# Patient Record
Sex: FEMALE | Race: WHITE | NOT HISPANIC OR LATINO | URBAN - NONMETROPOLITAN AREA
[De-identification: names, ages, dates, MRNs, and addresses within clinical notes are randomized per-mention and may not be internally consistent; named-entity substitution may affect disease eponyms.]

---

## 2022-08-10 ENCOUNTER — APPOINTMENT (OUTPATIENT)
Dept: LAB | Facility: MEDICAL CENTER | Age: 67
End: 2022-08-10
Payer: MEDICARE

## 2022-08-10 DIAGNOSIS — Z01.818 OTHER SPECIFIED PRE-OPERATIVE EXAMINATION: ICD-10-CM

## 2022-08-10 LAB
ALBUMIN SERPL BCP-MCNC: 3.8 G/DL (ref 3.5–5)
ALP SERPL-CCNC: 99 U/L (ref 46–116)
ALT SERPL W P-5'-P-CCNC: 22 U/L (ref 12–78)
ANION GAP SERPL CALCULATED.3IONS-SCNC: 7 MMOL/L (ref 4–13)
APTT PPP: 26 SECONDS (ref 23–37)
AST SERPL W P-5'-P-CCNC: 14 U/L (ref 5–45)
BASOPHILS # BLD AUTO: 0.05 THOUSANDS/ΜL (ref 0–0.1)
BASOPHILS NFR BLD AUTO: 1 % (ref 0–1)
BILIRUB SERPL-MCNC: 0.54 MG/DL (ref 0.2–1)
BUN SERPL-MCNC: 13 MG/DL (ref 5–25)
CALCIUM SERPL-MCNC: 9.6 MG/DL (ref 8.3–10.1)
CHLORIDE SERPL-SCNC: 106 MMOL/L (ref 96–108)
CO2 SERPL-SCNC: 26 MMOL/L (ref 21–32)
CREAT SERPL-MCNC: 0.97 MG/DL (ref 0.6–1.3)
EOSINOPHIL # BLD AUTO: 0.16 THOUSAND/ΜL (ref 0–0.61)
EOSINOPHIL NFR BLD AUTO: 3 % (ref 0–6)
ERYTHROCYTE [DISTWIDTH] IN BLOOD BY AUTOMATED COUNT: 13.3 % (ref 11.6–15.1)
GFR SERPL CREATININE-BSD FRML MDRD: 60 ML/MIN/1.73SQ M
GLUCOSE P FAST SERPL-MCNC: 114 MG/DL (ref 65–99)
HCT VFR BLD AUTO: 44.1 % (ref 34.8–46.1)
HGB BLD-MCNC: 14.5 G/DL (ref 11.5–15.4)
IMM GRANULOCYTES # BLD AUTO: 0.02 THOUSAND/UL (ref 0–0.2)
IMM GRANULOCYTES NFR BLD AUTO: 0 % (ref 0–2)
INR PPP: 0.91 (ref 0.84–1.19)
LYMPHOCYTES # BLD AUTO: 1.25 THOUSANDS/ΜL (ref 0.6–4.47)
LYMPHOCYTES NFR BLD AUTO: 23 % (ref 14–44)
MCH RBC QN AUTO: 30 PG (ref 26.8–34.3)
MCHC RBC AUTO-ENTMCNC: 32.9 G/DL (ref 31.4–37.4)
MCV RBC AUTO: 91 FL (ref 82–98)
MONOCYTES # BLD AUTO: 0.58 THOUSAND/ΜL (ref 0.17–1.22)
MONOCYTES NFR BLD AUTO: 11 % (ref 4–12)
NEUTROPHILS # BLD AUTO: 3.35 THOUSANDS/ΜL (ref 1.85–7.62)
NEUTS SEG NFR BLD AUTO: 62 % (ref 43–75)
NRBC BLD AUTO-RTO: 0 /100 WBCS
PLATELET # BLD AUTO: 242 THOUSANDS/UL (ref 149–390)
PMV BLD AUTO: 10 FL (ref 8.9–12.7)
POTASSIUM SERPL-SCNC: 3.7 MMOL/L (ref 3.5–5.3)
PROT SERPL-MCNC: 7.7 G/DL (ref 6.4–8.4)
PROTHROMBIN TIME: 12.5 SECONDS (ref 11.6–14.5)
RBC # BLD AUTO: 4.83 MILLION/UL (ref 3.81–5.12)
SODIUM SERPL-SCNC: 139 MMOL/L (ref 135–147)
WBC # BLD AUTO: 5.41 THOUSAND/UL (ref 4.31–10.16)

## 2022-08-10 PROCEDURE — 36415 COLL VENOUS BLD VENIPUNCTURE: CPT

## 2022-08-10 PROCEDURE — 85610 PROTHROMBIN TIME: CPT

## 2022-08-10 PROCEDURE — 80053 COMPREHEN METABOLIC PANEL: CPT

## 2022-08-10 PROCEDURE — 85025 COMPLETE CBC W/AUTO DIFF WBC: CPT

## 2022-08-10 PROCEDURE — 85730 THROMBOPLASTIN TIME PARTIAL: CPT

## 2024-12-12 ENCOUNTER — OFFICE VISIT (OUTPATIENT)
Dept: URGENT CARE | Facility: MEDICAL CENTER | Age: 69
End: 2024-12-12
Payer: MEDICARE

## 2024-12-12 VITALS
WEIGHT: 199 LBS | TEMPERATURE: 97.9 F | RESPIRATION RATE: 16 BRPM | SYSTOLIC BLOOD PRESSURE: 138 MMHG | DIASTOLIC BLOOD PRESSURE: 94 MMHG | HEART RATE: 82 BPM | OXYGEN SATURATION: 98 %

## 2024-12-12 DIAGNOSIS — M54.50 ACUTE RIGHT-SIDED LOW BACK PAIN WITHOUT SCIATICA: Primary | ICD-10-CM

## 2024-12-12 PROCEDURE — 99212 OFFICE O/P EST SF 10 MIN: CPT | Performed by: PHYSICAL MEDICINE & REHABILITATION

## 2024-12-12 PROCEDURE — G0463 HOSPITAL OUTPT CLINIC VISIT: HCPCS | Performed by: PHYSICAL MEDICINE & REHABILITATION

## 2024-12-12 RX ORDER — ACETAMINOPHEN 160 MG
2000 TABLET,DISINTEGRATING ORAL DAILY
COMMUNITY
Start: 2024-07-10

## 2024-12-12 RX ORDER — ATORVASTATIN CALCIUM 20 MG/1
20 TABLET, FILM COATED ORAL EVERY MORNING
COMMUNITY
Start: 2024-12-03

## 2024-12-12 RX ORDER — HYDROCHLOROTHIAZIDE 12.5 MG/1
12.5 CAPSULE ORAL DAILY
COMMUNITY
Start: 2024-10-29

## 2024-12-12 RX ORDER — PREDNISONE 20 MG/1
40 TABLET ORAL DAILY
Qty: 10 TABLET | Refills: 0 | Status: SHIPPED | OUTPATIENT
Start: 2024-12-12 | End: 2024-12-17

## 2024-12-12 RX ORDER — CYCLOBENZAPRINE HCL 10 MG
10 TABLET ORAL 3 TIMES DAILY PRN
Qty: 20 TABLET | Refills: 0 | Status: SHIPPED | OUTPATIENT
Start: 2024-12-12

## 2024-12-12 RX ORDER — LOSARTAN POTASSIUM 50 MG/1
50 TABLET ORAL DAILY
COMMUNITY
Start: 2024-10-29

## 2024-12-12 RX ORDER — PANTOPRAZOLE SODIUM 40 MG/1
40 TABLET, DELAYED RELEASE ORAL DAILY
COMMUNITY
Start: 2024-09-20

## 2024-12-12 NOTE — PROGRESS NOTES
"  [facility]        NAME: Miley Locke is a 69 y.o. female  : 1955    MRN: [id number]  DATE: 2024  TIME: 1:34 PM    Assessment and Plan   Acute right-sided low back pain without sciatica [M54.50]  1. Acute right-sided low back pain without sciatica  predniSONE 20 mg tablet    cyclobenzaprine (FLEXERIL) 10 mg tablet        Likely a muscle strain given exertion 2 days prior to the pain  Will trial Prednisone and Flexeril.   Advised to take Prednisone in the morning and to trial Flexeril at nighttime first. Did note low sedation potential with Flexeril. If no sedation, may take up to TID PRN.    Patient Instructions       Follow up with PCP in 3-5 days.  Proceed to  ER if symptoms worsen.    If tests are performed, our office will contact you with results only if changes need to made to the care plan discussed with you at the visit. [facility]    Chief Complaint     Chief Complaint   Patient presents with    Back Pain     Right proximal back pain which started 1 week ago.         History of Present Illness       Pt is a 69 year old female presenting with right paraspinal mid-low back pain that started approximately 1 week ago. She does not remember a direct injury/trauma to the region. She denies having a fall. She does not possibly 2 days prior to the pain starting she was cleaning her bathroom from top to bottom and was on her hands/knees and twisting. She notes she was using a toothbrush to scrub the grout between her tiles. She denies weakness or shooting pain down the LE. She denies numbness/tingling. She states the pain is intermittent, coming and going. She denies dysuria, urinary frequency or urgency. She notes that she has had kidney stones in the past and that this does not feel similar. She describes the pain as an ache stating \"it feels like the pain of a bruise\".     Back Pain  Pertinent negatives include no numbness or " weakness.       Review of Systems   Review of Systems   Constitutional: Negative.    Respiratory: Negative.     Cardiovascular: Negative.    Gastrointestinal: Negative.    Genitourinary: Negative.    Musculoskeletal:  Positive for back pain.   Skin: Negative.    Neurological: Negative.  Negative for weakness and numbness.         Current Medications       Current Outpatient Medications:     atorvastatin (LIPITOR) 20 mg tablet, Take 20 mg by mouth every morning, Disp: , Rfl:     Cholecalciferol (Vitamin D3) 50 MCG (2000 UT) capsule, Take 2,000 Units by mouth daily, Disp: , Rfl:     cyclobenzaprine (FLEXERIL) 10 mg tablet, Take 1 tablet (10 mg total) by mouth 3 (three) times a day as needed for muscle spasms, Disp: 20 tablet, Rfl: 0    hydroCHLOROthiazide 12.5 mg capsule, Take 12.5 mg by mouth daily, Disp: , Rfl:     losartan (COZAAR) 50 mg tablet, Take 50 mg by mouth daily, Disp: , Rfl:     pantoprazole (PROTONIX) 40 mg tablet, Take 40 mg by mouth daily, Disp: , Rfl:     predniSONE 20 mg tablet, Take 2 tablets (40 mg total) by mouth daily for 5 days, Disp: 10 tablet, Rfl: 0    Current Allergies     Allergies as of 12/12/2024 - never reviewed   Allergen Reaction Noted    Aspirin Edema 12/12/2024            The following portions of the patient's history were reviewed and updated as appropriate: allergies, current medications, past family history, past medical history, past social history, past surgical history and problem list.     Past Medical History:   Diagnosis Date    Acid reflux     Hypertension        Past Surgical History:   Procedure Laterality Date    HYSTERECTOMY      JOINT REPLACEMENT      KNEE ARTHROCENTESIS         History reviewed. No pertinent family history.      Medications have been verified.        Objective   /94   Pulse 82   Temp 97.9 °F (36.6 °C)   Resp 16   Wt 90.3 kg (199 lb)   SpO2 98%        Physical Exam     Physical Exam  Vitals reviewed.   Constitutional:       General: She is  not in acute distress.     Appearance: She is not ill-appearing.   Cardiovascular:      Rate and Rhythm: Normal rate and regular rhythm.      Pulses: Normal pulses.      Heart sounds: Normal heart sounds.   Pulmonary:      Effort: Pulmonary effort is normal. No respiratory distress.      Breath sounds: Normal breath sounds.   Abdominal:      General: Bowel sounds are normal. There is no distension.      Palpations: Abdomen is soft.      Tenderness: There is no abdominal tenderness. There is no right CVA tenderness or left CVA tenderness.   Musculoskeletal:         General: Swelling and tenderness present. No deformity or signs of injury. Normal range of motion.   Skin:     General: Skin is warm.   Neurological:      General: No focal deficit present.      Mental Status: She is alert and oriented to person, place, and time.      Sensory: No sensory deficit.      Motor: No weakness.      Gait: Gait normal.

## 2025-02-07 ENCOUNTER — OFFICE VISIT (OUTPATIENT)
Dept: URGENT CARE | Facility: MEDICAL CENTER | Age: 70
End: 2025-02-07
Payer: MEDICARE

## 2025-02-07 VITALS
RESPIRATION RATE: 18 BRPM | TEMPERATURE: 98.7 F | HEIGHT: 66 IN | SYSTOLIC BLOOD PRESSURE: 126 MMHG | OXYGEN SATURATION: 97 % | DIASTOLIC BLOOD PRESSURE: 63 MMHG | BODY MASS INDEX: 31.02 KG/M2 | WEIGHT: 193 LBS | HEART RATE: 88 BPM

## 2025-02-07 DIAGNOSIS — R14.3: Primary | ICD-10-CM

## 2025-02-07 PROBLEM — R01.1 HEART MURMUR: Status: ACTIVE | Noted: 2022-12-27

## 2025-02-07 PROBLEM — E78.5 DYSLIPIDEMIA: Status: ACTIVE | Noted: 2019-06-11

## 2025-02-07 PROBLEM — Z96.659 HISTORY OF PARTIAL KNEE REPLACEMENT: Status: ACTIVE | Noted: 2022-12-27

## 2025-02-07 PROBLEM — R73.03 PREDIABETES: Status: ACTIVE | Noted: 2018-11-07

## 2025-02-07 PROCEDURE — G0463 HOSPITAL OUTPT CLINIC VISIT: HCPCS

## 2025-02-07 PROCEDURE — 99213 OFFICE O/P EST LOW 20 MIN: CPT

## 2025-02-07 NOTE — PROGRESS NOTES
[facility]        NAME: Miley Locke is a 69 y.o. female  : 1955    MRN: [id number]  DATE: 2025  TIME: 7:08 PM    Assessment and Plan   Pain relieved by passing flatus [R14.3]  1. Pain relieved by passing flatus              Patient Instructions       Follow up with PCP in 3-5 days.  Proceed to  ER if symptoms worsen.    If tests are performed, our office will contact you with results only if changes need to made to the care plan discussed with you at the visit. [facility].    Chief Complaint     Chief Complaint   Patient presents with   • Abdominal Pain     Stomach cramping that started on Wednesday night          History of Present Illness       Patient here with stomach cramping. She has been drinking fluids but has had a decreased appetite, no nausea or vomiting.  No headache, dizziness. She reports the pain is in the middle and low, it resolves with passing gas. She has had normal BM. Last BM was 11am. She was having some difficulty with moving bowels initially but has since changed to normal color and consistency. Denies any urinary symptoms. No fever or chills.         Review of Systems   Review of Systems   Constitutional:  Positive for appetite change. Negative for chills, fatigue and fever.   Gastrointestinal:  Positive for abdominal pain. Negative for constipation, diarrhea, nausea and vomiting.   Skin:  Negative for color change and rash.   Neurological:  Negative for dizziness, light-headedness and headaches.         Current Medications       Current Outpatient Medications:   •  atorvastatin (LIPITOR) 20 mg tablet, Take 20 mg by mouth every morning, Disp: , Rfl:   •  cyclobenzaprine (FLEXERIL) 10 mg tablet, Take 1 tablet (10 mg total) by mouth 3 (three) times a day as needed for muscle spasms, Disp: 20 tablet, Rfl: 0  •  hydroCHLOROthiazide 12.5 mg capsule, Take 12.5 mg by mouth daily, Disp: , Rfl:   •  losartan (COZAAR) 50  "mg tablet, Take 50 mg by mouth daily, Disp: , Rfl:   •  pantoprazole (PROTONIX) 40 mg tablet, Take 40 mg by mouth daily, Disp: , Rfl:   •  Cholecalciferol (Vitamin D3) 50 MCG (2000 UT) capsule, Take 2,000 Units by mouth daily, Disp: , Rfl:     Current Allergies     Allergies as of 02/07/2025 - Reviewed 02/07/2025   Allergen Reaction Noted   • Aspirin Edema and Swelling 04/14/2003            The following portions of the patient's history were reviewed and updated as appropriate: allergies, current medications, past family history, past medical history, past social history, past surgical history and problem list.     Past Medical History:   Diagnosis Date   • Acid reflux    • Hypertension        Past Surgical History:   Procedure Laterality Date   • HYSTERECTOMY     • JOINT REPLACEMENT     • KNEE ARTHROCENTESIS         History reviewed. No pertinent family history.      Medications have been verified.        Objective   /63   Pulse 88   Temp 98.7 °F (37.1 °C)   Resp 18   Ht 5' 6\" (1.676 m)   Wt 87.5 kg (193 lb)   SpO2 97%   BMI 31.15 kg/m²        Physical Exam     Physical Exam  Vitals and nursing note reviewed.   Constitutional:       General: She is awake. She is not in acute distress.     Appearance: Normal appearance. She is well-developed and normal weight. She is not ill-appearing.   HENT:      Head: Normocephalic and atraumatic.      Nose: Nose normal.      Mouth/Throat:      Lips: Pink.      Mouth: Mucous membranes are moist.      Pharynx: Oropharynx is clear.   Eyes:      Extraocular Movements: Extraocular movements intact.      Conjunctiva/sclera: Conjunctivae normal.      Pupils: Pupils are equal, round, and reactive to light.   Cardiovascular:      Rate and Rhythm: Normal rate and regular rhythm.      Pulses: Normal pulses.      Heart sounds: Normal heart sounds.   Pulmonary:      Effort: Pulmonary effort is normal.      Breath sounds: Normal breath sounds.   Abdominal:      General: Abdomen " is flat. Bowel sounds are normal.      Palpations: Abdomen is soft.      Tenderness: There is no abdominal tenderness. There is no right CVA tenderness or left CVA tenderness.   Musculoskeletal:         General: Normal range of motion.      Cervical back: Full passive range of motion without pain, normal range of motion and neck supple.   Skin:     General: Skin is warm and dry.      Capillary Refill: Capillary refill takes less than 2 seconds.      Findings: No rash.   Neurological:      General: No focal deficit present.      Mental Status: She is alert and oriented to person, place, and time. Mental status is at baseline.   Psychiatric:         Mood and Affect: Mood normal.         Behavior: Behavior normal. Behavior is cooperative.

## 2025-02-09 ENCOUNTER — APPOINTMENT (EMERGENCY)
Dept: CT IMAGING | Facility: HOSPITAL | Age: 70
DRG: 391 | End: 2025-02-09
Payer: MEDICARE

## 2025-02-09 ENCOUNTER — HOSPITAL ENCOUNTER (INPATIENT)
Facility: HOSPITAL | Age: 70
LOS: 3 days | Discharge: HOME | DRG: 391 | End: 2025-02-13
Attending: EMERGENCY MEDICINE | Admitting: HOSPITALIST
Payer: MEDICARE

## 2025-02-09 DIAGNOSIS — K57.92 DIVERTICULITIS: ICD-10-CM

## 2025-02-09 DIAGNOSIS — K57.20 COLONIC DIVERTICULAR ABSCESS: Primary | ICD-10-CM

## 2025-02-09 PROBLEM — K59.00 CONSTIPATION: Status: ACTIVE | Noted: 2025-02-09

## 2025-02-09 LAB
ALBUMIN SERPL BCG-MCNC: 4.2 G/DL (ref 3.5–5)
ALP SERPL-CCNC: 68 U/L (ref 34–104)
ALT SERPL W P-5'-P-CCNC: 9 U/L (ref 7–52)
ANION GAP SERPL CALCULATED.3IONS-SCNC: 12 MMOL/L (ref 4–13)
AST SERPL W P-5'-P-CCNC: 12 U/L (ref 13–39)
BACTERIA UR QL AUTO: NORMAL /HPF
BASOPHILS # BLD AUTO: 0.06 THOUSANDS/ÂΜL (ref 0–0.1)
BASOPHILS NFR BLD AUTO: 1 % (ref 0–1)
BILIRUB SERPL-MCNC: 1.23 MG/DL (ref 0.2–1)
BILIRUB UR QL STRIP: NEGATIVE
BUN SERPL-MCNC: 14 MG/DL (ref 5–25)
CALCIUM SERPL-MCNC: 9.6 MG/DL (ref 8.4–10.2)
CHLORIDE SERPL-SCNC: 96 MMOL/L (ref 96–108)
CLARITY UR: CLEAR
CO2 SERPL-SCNC: 28 MMOL/L (ref 21–32)
COLOR UR: COLORLESS
CREAT SERPL-MCNC: 1.13 MG/DL (ref 0.6–1.3)
EOSINOPHIL # BLD AUTO: 0.1 THOUSAND/ÂΜL (ref 0–0.61)
EOSINOPHIL NFR BLD AUTO: 1 % (ref 0–6)
ERYTHROCYTE [DISTWIDTH] IN BLOOD BY AUTOMATED COUNT: 12.5 % (ref 11.6–15.1)
FLUAV AG UPPER RESP QL IA.RAPID: NEGATIVE
FLUBV AG UPPER RESP QL IA.RAPID: NEGATIVE
GFR SERPL CREATININE-BSD FRML MDRD: 49 ML/MIN/1.73SQ M
GLUCOSE SERPL-MCNC: 112 MG/DL (ref 65–140)
GLUCOSE UR STRIP-MCNC: NEGATIVE MG/DL
HCT VFR BLD AUTO: 40.5 % (ref 34.8–46.1)
HGB BLD-MCNC: 13.9 G/DL (ref 11.5–15.4)
HGB UR QL STRIP.AUTO: ABNORMAL
IMM GRANULOCYTES # BLD AUTO: 0.03 THOUSAND/UL (ref 0–0.2)
IMM GRANULOCYTES NFR BLD AUTO: 0 % (ref 0–2)
KETONES UR STRIP-MCNC: NEGATIVE MG/DL
LACTATE SERPL-SCNC: 1.5 MMOL/L (ref 0.5–2)
LEUKOCYTE ESTERASE UR QL STRIP: NEGATIVE
LIPASE SERPL-CCNC: 29 U/L (ref 11–82)
LYMPHOCYTES # BLD AUTO: 1 THOUSANDS/ÂΜL (ref 0.6–4.47)
LYMPHOCYTES NFR BLD AUTO: 10 % (ref 14–44)
MCH RBC QN AUTO: 30.9 PG (ref 26.8–34.3)
MCHC RBC AUTO-ENTMCNC: 34.3 G/DL (ref 31.4–37.4)
MCV RBC AUTO: 90 FL (ref 82–98)
MONOCYTES # BLD AUTO: 0.9 THOUSAND/ÂΜL (ref 0.17–1.22)
MONOCYTES NFR BLD AUTO: 9 % (ref 4–12)
NEUTROPHILS # BLD AUTO: 7.68 THOUSANDS/ÂΜL (ref 1.85–7.62)
NEUTS SEG NFR BLD AUTO: 79 % (ref 43–75)
NITRITE UR QL STRIP: NEGATIVE
NON-SQ EPI CELLS URNS QL MICRO: NORMAL /HPF
NRBC BLD AUTO-RTO: 0 /100 WBCS
PH UR STRIP.AUTO: 6 [PH]
PLATELET # BLD AUTO: 270 THOUSANDS/UL (ref 149–390)
PMV BLD AUTO: 9.3 FL (ref 8.9–12.7)
POTASSIUM SERPL-SCNC: 3 MMOL/L (ref 3.5–5.3)
PROT SERPL-MCNC: 7.6 G/DL (ref 6.4–8.4)
PROT UR STRIP-MCNC: NEGATIVE MG/DL
RBC # BLD AUTO: 4.5 MILLION/UL (ref 3.81–5.12)
RBC #/AREA URNS AUTO: NORMAL /HPF
SARS-COV+SARS-COV-2 AG RESP QL IA.RAPID: NEGATIVE
SODIUM SERPL-SCNC: 136 MMOL/L (ref 135–147)
SP GR UR STRIP.AUTO: <1.005 (ref 1–1.03)
UROBILINOGEN UR STRIP-ACNC: <2 MG/DL
WBC # BLD AUTO: 9.77 THOUSAND/UL (ref 4.31–10.16)
WBC #/AREA URNS AUTO: NORMAL /HPF

## 2025-02-09 PROCEDURE — 36415 COLL VENOUS BLD VENIPUNCTURE: CPT | Performed by: EMERGENCY MEDICINE

## 2025-02-09 PROCEDURE — 81001 URINALYSIS AUTO W/SCOPE: CPT | Performed by: EMERGENCY MEDICINE

## 2025-02-09 PROCEDURE — 87811 SARS-COV-2 COVID19 W/OPTIC: CPT | Performed by: EMERGENCY MEDICINE

## 2025-02-09 PROCEDURE — 74177 CT ABD & PELVIS W/CONTRAST: CPT

## 2025-02-09 PROCEDURE — 83605 ASSAY OF LACTIC ACID: CPT | Performed by: EMERGENCY MEDICINE

## 2025-02-09 PROCEDURE — 99285 EMERGENCY DEPT VISIT HI MDM: CPT | Performed by: EMERGENCY MEDICINE

## 2025-02-09 PROCEDURE — 96361 HYDRATE IV INFUSION ADD-ON: CPT

## 2025-02-09 PROCEDURE — 87804 INFLUENZA ASSAY W/OPTIC: CPT | Performed by: EMERGENCY MEDICINE

## 2025-02-09 PROCEDURE — 80053 COMPREHEN METABOLIC PANEL: CPT | Performed by: EMERGENCY MEDICINE

## 2025-02-09 PROCEDURE — 83690 ASSAY OF LIPASE: CPT | Performed by: EMERGENCY MEDICINE

## 2025-02-09 PROCEDURE — 85025 COMPLETE CBC W/AUTO DIFF WBC: CPT | Performed by: EMERGENCY MEDICINE

## 2025-02-09 PROCEDURE — 99223 1ST HOSP IP/OBS HIGH 75: CPT | Performed by: INTERNAL MEDICINE

## 2025-02-09 PROCEDURE — 96367 TX/PROPH/DG ADDL SEQ IV INF: CPT

## 2025-02-09 PROCEDURE — 96365 THER/PROPH/DIAG IV INF INIT: CPT

## 2025-02-09 PROCEDURE — 99284 EMERGENCY DEPT VISIT MOD MDM: CPT

## 2025-02-09 PROCEDURE — 87040 BLOOD CULTURE FOR BACTERIA: CPT | Performed by: EMERGENCY MEDICINE

## 2025-02-09 RX ORDER — ATORVASTATIN CALCIUM 10 MG/1
20 TABLET, FILM COATED ORAL
Status: DISCONTINUED | OUTPATIENT
Start: 2025-02-10 | End: 2025-02-13 | Stop reason: HOSPADM

## 2025-02-09 RX ORDER — MORPHINE SULFATE 4 MG/ML
4 INJECTION, SOLUTION INTRAMUSCULAR; INTRAVENOUS EVERY 4 HOURS PRN
Status: DISCONTINUED | OUTPATIENT
Start: 2025-02-09 | End: 2025-02-13 | Stop reason: HOSPADM

## 2025-02-09 RX ORDER — METRONIDAZOLE 500 MG/100ML
500 INJECTION, SOLUTION INTRAVENOUS ONCE
Status: COMPLETED | OUTPATIENT
Start: 2025-02-09 | End: 2025-02-09

## 2025-02-09 RX ORDER — CEFTRIAXONE 2 G/50ML
2000 INJECTION, SOLUTION INTRAVENOUS ONCE
Status: COMPLETED | OUTPATIENT
Start: 2025-02-09 | End: 2025-02-09

## 2025-02-09 RX ORDER — POTASSIUM CHLORIDE 1500 MG/1
40 TABLET, EXTENDED RELEASE ORAL ONCE
Status: COMPLETED | OUTPATIENT
Start: 2025-02-09 | End: 2025-02-09

## 2025-02-09 RX ORDER — PANTOPRAZOLE SODIUM 40 MG/1
40 TABLET, DELAYED RELEASE ORAL
Status: DISCONTINUED | OUTPATIENT
Start: 2025-02-10 | End: 2025-02-13 | Stop reason: HOSPADM

## 2025-02-09 RX ORDER — ONDANSETRON 2 MG/ML
4 INJECTION INTRAMUSCULAR; INTRAVENOUS EVERY 6 HOURS PRN
Status: DISCONTINUED | OUTPATIENT
Start: 2025-02-09 | End: 2025-02-13 | Stop reason: HOSPADM

## 2025-02-09 RX ORDER — HYDROCHLOROTHIAZIDE 12.5 MG/1
12.5 TABLET ORAL DAILY
Status: DISCONTINUED | OUTPATIENT
Start: 2025-02-10 | End: 2025-02-13 | Stop reason: HOSPADM

## 2025-02-09 RX ORDER — SODIUM CHLORIDE, SODIUM GLUCONATE, SODIUM ACETATE, POTASSIUM CHLORIDE, MAGNESIUM CHLORIDE, SODIUM PHOSPHATE, DIBASIC, AND POTASSIUM PHOSPHATE .53; .5; .37; .037; .03; .012; .00082 G/100ML; G/100ML; G/100ML; G/100ML; G/100ML; G/100ML; G/100ML
100 INJECTION, SOLUTION INTRAVENOUS CONTINUOUS
Status: DISPENSED | OUTPATIENT
Start: 2025-02-09 | End: 2025-02-11

## 2025-02-09 RX ORDER — ENOXAPARIN SODIUM 100 MG/ML
40 INJECTION SUBCUTANEOUS DAILY
Status: DISCONTINUED | OUTPATIENT
Start: 2025-02-10 | End: 2025-02-13 | Stop reason: HOSPADM

## 2025-02-09 RX ORDER — METRONIDAZOLE 500 MG/100ML
500 INJECTION, SOLUTION INTRAVENOUS EVERY 8 HOURS
Status: DISCONTINUED | OUTPATIENT
Start: 2025-02-10 | End: 2025-02-11

## 2025-02-09 RX ORDER — CEFTRIAXONE 2 G/50ML
2000 INJECTION, SOLUTION INTRAVENOUS EVERY 24 HOURS
Status: DISCONTINUED | OUTPATIENT
Start: 2025-02-10 | End: 2025-02-13 | Stop reason: HOSPADM

## 2025-02-09 RX ORDER — ACETAMINOPHEN 325 MG/1
650 TABLET ORAL EVERY 6 HOURS PRN
Status: DISCONTINUED | OUTPATIENT
Start: 2025-02-09 | End: 2025-02-13 | Stop reason: HOSPADM

## 2025-02-09 RX ORDER — LOSARTAN POTASSIUM 50 MG/1
50 TABLET ORAL DAILY
Status: DISCONTINUED | OUTPATIENT
Start: 2025-02-10 | End: 2025-02-13 | Stop reason: HOSPADM

## 2025-02-09 RX ADMIN — METRONIDAZOLE 500 MG: 500 INJECTION, SOLUTION INTRAVENOUS at 20:11

## 2025-02-09 RX ADMIN — IOHEXOL 100 ML: 350 INJECTION, SOLUTION INTRAVENOUS at 18:23

## 2025-02-09 RX ADMIN — CEFTRIAXONE 2000 MG: 2 INJECTION, SOLUTION INTRAVENOUS at 19:35

## 2025-02-09 RX ADMIN — SODIUM CHLORIDE 1000 ML: 0.9 INJECTION, SOLUTION INTRAVENOUS at 17:30

## 2025-02-09 RX ADMIN — POTASSIUM CHLORIDE 40 MEQ: 1500 TABLET, EXTENDED RELEASE ORAL at 22:23

## 2025-02-09 NOTE — ED PROVIDER NOTES
Final Diagnosis:  1. Diverticulitis        MDM:  -Patient presents for concerns of bowel obstruction.  Will also evaluate for other signs of infection, pancreatitis, anemia, leukocytosis or biliary disease.  Provide CT imaging to evaluate for signs of diverticulitis, obstruction, Juliana cystitis.  Evaluate for Davidson, urinary tract infection.  - CT imaging shows diverticulitis and abscess.  Reviewed with surgery.  They suggest continued antibiotics and admission for evaluation in the morning.  This was reviewed with the medical team.  Admitted to their care.    Chief Complaint   Patient presents with    Constipation     Patient seen on Wednesday at urgent care for gas, states last BM was last night around 1900 and was more like diarrhea. Here due to concerns due to hx of bowel obstruction     HPI  Patient presents for evaluation of intermittent abdominal pain, decreased bowel movements.  Concern for small bowel obstruction.  Patient states that for the last week she has been having some intermittent bowel pain.  Originally this was on the right lower quadrant.  She went to another facility and was evaluated there.  They felt that it was unlikely that the patient had acute pathology and suggested Gas-X as well as Pepto-Bismol.  Patient felt a little bit better on Thursday and Friday then a little bit worse yesterday.  Today she started to have increased pain around 11:00 as well as decreased bowel movements.  She is not sure if this is because of her decreased intake or not.  She states that she did have a bowel surgery done at some time in this area.  Prior to that her only other abdominal surgery was a hysterectomy and I believe she said that was in 1989.  As far as he knows she does have her appendix and gallbladder.  Currently she identifies her pain is being intermittent and in the left lower quadrant.  No fever or chills.  No nausea vomiting.      Unless otherwise specified:  - No language barrier.   - History  obtained from patient.   - There are no limitations to the history obtained.  - Previous charting was reviewed    PMH:   has a past medical history of Acid reflux, Bowel obstruction (HCC), and Hypertension.    PSH:   has a past surgical history that includes Knee arthrocentesis; Joint replacement; and Hysterectomy.       ROS:  Review of Systems   - 13 point ROS was performed and all are normal unless stated in the history above.   - Nursing note reviewed. Vitals reviewed.   - Orders placed by myself and/or advanced practitioner / resident.        PE:   Vitals:    02/09/25 1703 02/09/25 1800 02/09/25 1900 02/09/25 2015   BP: 129/70 109/59 133/63 121/60   BP Location:  Left arm Left arm Left arm   Pulse: 101 81 91 84   Resp: 16 16 18 18   Temp: 98 °F (36.7 °C) 98 °F (36.7 °C) 98 °F (36.7 °C) 98 °F (36.7 °C)   TempSrc: Temporal Temporal Temporal Temporal   SpO2: 94% 97% 94% 96%     Vitals reviewed by me.       Unless otherwise specified above:    General: VS reviewed  Appears in NAD    Head: Normocephalic, atraumatic  Eyes: EOM-I.     ENT: Atraumatic external nose and ears.    No drooling.     Neck: No JVD.    CV: No pallor noted  Lungs:   No tachypnea  No respiratory distress    Abdomen:  Soft, non-tender, non-distended    MSK:   No obvious deformity    Skin: Dry, intact. No obvious rash.    Psychiatric/Behavioral: Appropriate mood and affect   Exam: deferred    Physical Exam     Procedures   - Nursing note reviewed.                   ED Course as of 02/09/25 2031   Sun Feb 09, 2025   1842 CT abdomen pelvis with contrast  No free air in the abdomen.  No obvious fluid collection.  There seems to be some irregularity in the ball along the right side.  Unclear if that was secondary to prior surgical call intervention.  Pending official interpretation.   1921 Contacted by radiology regarding CT scan.  Showed diverticulitis with small abscess.  Will review with surgery.     CT abdomen pelvis with contrast   Final Result       Distal sigmoid diverticulitis complicated by 3 cm abscess.      Note that the abscess projects inferiorly from the sigmoid colon and is inseparable from the vaginal cuff status post hysterectomy.   Correlate clinically for any vaginal discharge to suggest a fistula.      Fluid contents within the cecum and distal small bowel, consistent with a diarrheal GI illness versus mild enterocolitis.   Possibly reactive to the above findings.      4 mm hypodense lesion in the pancreatic body.   For simple cyst(s) less than 1.5 cm, recommend follow-up every 2 years for 5 times or to age 80, whichever comes first.   Follow-up can stop at age 80 or can switch over to 80 year or older algorithm.   Recommend next follow-up in 2 years.   Preferred imaging modality: abdomen MRI and MRCP with and without IV contrast, or triple phase abdomen CT with IV contrast, or abdomen MRI and MRCP without IV contrast.      Findings were discussed with Dr. Smith from the ED at 7:10 p.m. on 2/9/2025         Workstation performed: [id number]           Orders Placed This Encounter   Procedures    FLU/COVID Rapid Antigen (30 min. TAT) - Preferred screening test in ED    Blood culture #1    Blood culture #2    CT abdomen pelvis with contrast    CBC and differential    Comprehensive metabolic panel    Lipase    UA w Reflex to Microscopic w Reflex to Culture    Lactic acid, plasma (w/reflex if result > 2.0)    Insert peripheral IV    Place in Observation     Labs Reviewed   CBC AND DIFFERENTIAL - Abnormal       Result Value Ref Range Status    WBC 9.77  4.31 - 10.16 Thousand/uL Final    RBC 4.50  3.81 - 5.12 Million/uL Final    Hemoglobin 13.9  11.5 - 15.4 g/dL Final    Hematocrit 40.5  34.8 - 46.1 % Final    MCV 90  82 - 98 fL Final    MCH 30.9  26.8 - 34.3 pg Final    MCHC 34.3  31.4 - 37.4 g/dL Final    RDW 12.5  11.6 - 15.1 % Final    MPV 9.3  8.9 - 12.7 fL Final    Platelets 270  149 - 390 Thousands/uL Final    nRBC 0  /100 WBCs Final     Segmented % 79 (*) 43 - 75 % Final    Immature Grans % 0  0 - 2 % Final    Lymphocytes % 10 (*) 14 - 44 % Final    Monocytes % 9  4 - 12 % Final    Eosinophils Relative 1  0 - 6 % Final    Basophils Relative 1  0 - 1 % Final    Absolute Neutrophils 7.68 (*) 1.85 - 7.62 Thousands/µL Final    Absolute Immature Grans 0.03  0.00 - 0.20 Thousand/uL Final    Absolute Lymphocytes 1.00  0.60 - 4.47 Thousands/µL Final    Absolute Monocytes 0.90  0.17 - 1.22 Thousand/µL Final    Eosinophils Absolute 0.10  0.00 - 0.61 Thousand/µL Final    Basophils Absolute 0.06  0.00 - 0.10 Thousands/µL Final   COMPREHENSIVE METABOLIC PANEL - Abnormal    Sodium 136  135 - 147 mmol/L Final    Potassium 3.0 (*) 3.5 - 5.3 mmol/L Final    Chloride 96  96 - 108 mmol/L Final    CO2 28  21 - 32 mmol/L Final    ANION GAP 12  4 - 13 mmol/L Final    BUN 14  5 - 25 mg/dL Final    Creatinine 1.13  0.60 - 1.30 mg/dL Final    Comment: Standardized to IDMS reference method    Glucose 112  65 - 140 mg/dL Final    Comment: If the patient is fasting, the ADA then defines impaired fasting glucose as > 100 mg/dL and diabetes as > or equal to 123 mg/dL.    Calcium 9.6  8.4 - 10.2 mg/dL Final    AST 12 (*) 13 - 39 U/L Final    ALT 9  7 - 52 U/L Final    Comment: Specimen collection should occur prior to Sulfasalazine administration due to the potential for falsely depressed results.     Alkaline Phosphatase 68  34 - 104 U/L Final    Total Protein 7.6  6.4 - 8.4 g/dL Final    Albumin 4.2  3.5 - 5.0 g/dL Final    Total Bilirubin 1.23 (*) 0.20 - 1.00 mg/dL Final    Comment: Use of this assay is not recommended for patients undergoing treatment with eltrombopag due to the potential for falsely elevated results.  N-acetyl-p-benzoquinone imine (metabolite of Acetaminophen) will generate erroneously low results in samples for patients that have taken an overdose of Acetaminophen.    eGFR 49  ml/min/1.73sq m Final    Narrative:     National Kid[employer] Foundation  guidelines for Chronic Kidney Disease (CKD):     Stage 1 with normal or high GFR (GFR > 90 mL/min/1.73 square meters)    Stage 2 Mild CKD (GFR = 60-89 mL/min/1.73 square meters)    Stage 3A Moderate CKD (GFR = 45-59 mL/min/1.73 square meters)    Stage 3B Moderate CKD (GFR = 30-44 mL/min/1.73 square meters)    Stage 4 Severe CKD (GFR = 15-29 mL/min/1.73 square meters)    Stage 5 End Stage CKD (GFR <15 mL/min/1.73 square meters)  Note: GFR calculation is accurate only with a steady state creatinine   COVID-19/INFLUENZA A/B RAPID ANTIGEN (30 MIN.TAT) - Normal    SARS COV Rapid Antigen Negative  Negative Final    Influenza A Rapid Antigen Negative  Negative Final    Influenza B Rapid Antigen Negative  Negative Final    Narrative:     This test has been performed using the CoContestidel Keisha 2 FLU+SARS Antigen test under the Emergency Use Authorization (EUA). This test has been validated by the  and verified by the performing laboratory. The Keisha uses lateral flow immunofluorescent sandwich assay to detect SARS-COV, Influenza A and Influenza B Antigen.     The Quidel Keisha 2 SARS Antigen test does not differentiate between SARS-CoV and SARS-CoV-2.     Negative results are presumptive and may be confirmed with a molecular assay, if necessary, for patient management. Negative results do not rule out SARS-CoV-2 or influenza infection and should not be used as the sole basis for treatment or patient management decisions. A negative test result may occur if the level of antigen in a sample is below the limit of detection of this test.     Positive results are indicative of the presence of viral antigens, but do not rule out bacterial infection or co-infection with other viruses.     All test results should be used as an adjunct to clinical observations and other information available to the provider.    FOR PEDIATRIC PATIENTS - copy/paste COVID Guidelines URL to browser:  [email or web address]   LIPASE - Normal    Lipase 29  11 - 82 u/L Final   LACTIC ACID, PLASMA (W/REFLEX IF RESULT > 2.0) - Normal    LACTIC ACID 1.5  0.5 - 2.0 mmol/L Final    Narrative:     Result may be elevated if tourniquet was used during collection.   BLOOD CULTURE   BLOOD CULTURE   UA W REFLEX TO MICROSCOPIC WITH REFLEX TO CULTURE         Final Diagnosis:  1. Diverticulitis              Unless otherwise noted the patient's medications were reviewed and they should continue as directed.    Unless otherwise specified:  CC is exclusive from any separately billable procedures  CC is exclusive of treating other patients  CC is exclusive of teaching time   All splints are static    Medications   metroNIDAZOLE (FLAGYL) IVPB (premix) 500 mg 100 mL (500 mg Intravenous New Bag 2/9/25 2011)   sodium chloride 0.9 % bolus 1,000 mL (0 mL Intravenous Stopped 2/9/25 1934)   iohexol (OMNIPAQUE) 350 MG/ML injection (MULTI-DOSE) 100 mL (100 mL Intravenous Given 2/9/25 1823)   cefTRIAXone (ROCEPHIN) IVPB (premix in dextrose) 2,000 mg 50 mL (0 mg Intravenous Stopped 2/9/25 2010)     Time reflects when diagnosis was documented in both MDM as applicable and the Disposition within this note       Time User Action Codes Description Comment    2/9/2025  8:28 PM Gavin Smith Add [K57.92] Diverticulitis           ED Disposition       ED Disposition   Admit    Condition   Stable    Date/Time   Sun Feb 9, 2025  8:28 PM    Comment   Case was discussed with LILIA and the patient's admission status was agreed to be Admission Status: observation status to the service of Dr. Ferguson .               Follow-up Information    None       Patient's Medications   Discharge Prescriptions    No medications on file     No discharge procedures on file.  Prior to Admission Medications   Prescriptions Last Dose Informant Patient Reported? Taking?   Cholecalciferol (Vitamin D3) 50 MCG (2000 UT) capsule  "  Yes No   Sig: Take 2,000 Units by mouth daily   atorvastatin (LIPITOR) 20 mg tablet   Yes No   Sig: Take 20 mg by mouth every morning   cyclobenzaprine (FLEXERIL) 10 mg tablet   No No   Sig: Take 1 tablet (10 mg total) by mouth 3 (three) times a day as needed for muscle spasms   hydroCHLOROthiazide 12.5 mg capsule   Yes No   Sig: Take 12.5 mg by mouth daily   losartan (COZAAR) 50 mg tablet   Yes No   Sig: Take 50 mg by mouth daily   pantoprazole (PROTONIX) 40 mg tablet   Yes No   Sig: Take 40 mg by mouth daily      Facility-Administered Medications: None       Portions of the record may have been created with voice recognition software. Occasional wrong word or \"sound a like\" substitutions may have occurred due to the inherent limitations of voice recognition software. Read the chart carefully and recognize, using context, where substitutions have occurred.    Electronically signed by:  MD Gavin Brenner MD  02/09/25 2031    "

## 2025-02-10 PROBLEM — R93.5 ABNORMAL CT OF THE ABDOMEN: Status: ACTIVE | Noted: 2025-02-10

## 2025-02-10 PROBLEM — K57.20 COLONIC DIVERTICULAR ABSCESS: Status: ACTIVE | Noted: 2025-02-10

## 2025-02-10 PROBLEM — E87.6 HYPOKALEMIA: Status: ACTIVE | Noted: 2025-02-10

## 2025-02-10 PROBLEM — I10 HYPERTENSION: Status: ACTIVE | Noted: 2025-02-10

## 2025-02-10 LAB
ALBUMIN SERPL BCG-MCNC: 3.7 G/DL (ref 3.5–5)
ALP SERPL-CCNC: 63 U/L (ref 34–104)
ALT SERPL W P-5'-P-CCNC: 7 U/L (ref 7–52)
ANION GAP SERPL CALCULATED.3IONS-SCNC: 10 MMOL/L (ref 4–13)
AST SERPL W P-5'-P-CCNC: 10 U/L (ref 13–39)
BASOPHILS # BLD AUTO: 0.05 THOUSANDS/ÂΜL (ref 0–0.1)
BASOPHILS NFR BLD AUTO: 1 % (ref 0–1)
BILIRUB SERPL-MCNC: 0.93 MG/DL (ref 0.2–1)
BUN SERPL-MCNC: 11 MG/DL (ref 5–25)
CALCIUM SERPL-MCNC: 8.9 MG/DL (ref 8.4–10.2)
CHLORIDE SERPL-SCNC: 104 MMOL/L (ref 96–108)
CO2 SERPL-SCNC: 26 MMOL/L (ref 21–32)
CREAT SERPL-MCNC: 0.97 MG/DL (ref 0.6–1.3)
EOSINOPHIL # BLD AUTO: 0.11 THOUSAND/ÂΜL (ref 0–0.61)
EOSINOPHIL NFR BLD AUTO: 1 % (ref 0–6)
ERYTHROCYTE [DISTWIDTH] IN BLOOD BY AUTOMATED COUNT: 12.7 % (ref 11.6–15.1)
GFR SERPL CREATININE-BSD FRML MDRD: 58 ML/MIN/1.73SQ M
GLUCOSE SERPL-MCNC: 92 MG/DL (ref 65–140)
HCT VFR BLD AUTO: 36.4 % (ref 34.8–46.1)
HGB BLD-MCNC: 12.6 G/DL (ref 11.5–15.4)
IMM GRANULOCYTES # BLD AUTO: 0.03 THOUSAND/UL (ref 0–0.2)
IMM GRANULOCYTES NFR BLD AUTO: 0 % (ref 0–2)
LYMPHOCYTES # BLD AUTO: 1.24 THOUSANDS/ÂΜL (ref 0.6–4.47)
LYMPHOCYTES NFR BLD AUTO: 14 % (ref 14–44)
MCH RBC QN AUTO: 31.4 PG (ref 26.8–34.3)
MCHC RBC AUTO-ENTMCNC: 34.6 G/DL (ref 31.4–37.4)
MCV RBC AUTO: 91 FL (ref 82–98)
MONOCYTES # BLD AUTO: 0.9 THOUSAND/ÂΜL (ref 0.17–1.22)
MONOCYTES NFR BLD AUTO: 10 % (ref 4–12)
NEUTROPHILS # BLD AUTO: 6.47 THOUSANDS/ÂΜL (ref 1.85–7.62)
NEUTS SEG NFR BLD AUTO: 74 % (ref 43–75)
NRBC BLD AUTO-RTO: 0 /100 WBCS
PLATELET # BLD AUTO: 236 THOUSANDS/UL (ref 149–390)
PMV BLD AUTO: 9.4 FL (ref 8.9–12.7)
POTASSIUM SERPL-SCNC: 3.7 MMOL/L (ref 3.5–5.3)
PROT SERPL-MCNC: 6.6 G/DL (ref 6.4–8.4)
RBC # BLD AUTO: 4.01 MILLION/UL (ref 3.81–5.12)
SODIUM SERPL-SCNC: 140 MMOL/L (ref 135–147)
WBC # BLD AUTO: 8.8 THOUSAND/UL (ref 4.31–10.16)

## 2025-02-10 PROCEDURE — 80053 COMPREHEN METABOLIC PANEL: CPT | Performed by: INTERNAL MEDICINE

## 2025-02-10 PROCEDURE — 85025 COMPLETE CBC W/AUTO DIFF WBC: CPT | Performed by: INTERNAL MEDICINE

## 2025-02-10 PROCEDURE — 99223 1ST HOSP IP/OBS HIGH 75: CPT | Performed by: SURGERY

## 2025-02-10 PROCEDURE — 99232 SBSQ HOSP IP/OBS MODERATE 35: CPT | Performed by: PHYSICIAN ASSISTANT

## 2025-02-10 PROCEDURE — NC001 PR NO CHARGE: Performed by: RADIOLOGY

## 2025-02-10 PROCEDURE — 99223 1ST HOSP IP/OBS HIGH 75: CPT | Performed by: INTERNAL MEDICINE

## 2025-02-10 PROCEDURE — G0545 PR INHERENT VISIT TO INPT: HCPCS | Performed by: INTERNAL MEDICINE

## 2025-02-10 RX ORDER — DOCUSATE SODIUM 100 MG/1
100 CAPSULE, LIQUID FILLED ORAL 2 TIMES DAILY
Status: DISCONTINUED | OUTPATIENT
Start: 2025-02-10 | End: 2025-02-13 | Stop reason: HOSPADM

## 2025-02-10 RX ADMIN — METRONIDAZOLE 500 MG: 500 INJECTION, SOLUTION INTRAVENOUS at 05:34

## 2025-02-10 RX ADMIN — DOCUSATE SODIUM 100 MG: 100 CAPSULE, LIQUID FILLED ORAL at 18:24

## 2025-02-10 RX ADMIN — DOCUSATE SODIUM 100 MG: 100 CAPSULE, LIQUID FILLED ORAL at 14:31

## 2025-02-10 RX ADMIN — Medication 2000 UNITS: at 09:18

## 2025-02-10 RX ADMIN — ATORVASTATIN CALCIUM 20 MG: 10 TABLET, FILM COATED ORAL at 18:24

## 2025-02-10 RX ADMIN — METRONIDAZOLE 500 MG: 500 INJECTION, SOLUTION INTRAVENOUS at 22:02

## 2025-02-10 RX ADMIN — SODIUM CHLORIDE, SODIUM GLUCONATE, SODIUM ACETATE, POTASSIUM CHLORIDE, MAGNESIUM CHLORIDE, SODIUM PHOSPHATE, DIBASIC, AND POTASSIUM PHOSPHATE 100 ML/HR: .53; .5; .37; .037; .03; .012; .00082 INJECTION, SOLUTION INTRAVENOUS at 14:30

## 2025-02-10 RX ADMIN — HYDROCHLOROTHIAZIDE 12.5 MG: 12.5 TABLET ORAL at 09:18

## 2025-02-10 RX ADMIN — CEFTRIAXONE 2000 MG: 2 INJECTION, SOLUTION INTRAVENOUS at 18:24

## 2025-02-10 RX ADMIN — METRONIDAZOLE 500 MG: 500 INJECTION, SOLUTION INTRAVENOUS at 11:55

## 2025-02-10 RX ADMIN — PANTOPRAZOLE SODIUM 40 MG: 40 TABLET, DELAYED RELEASE ORAL at 06:47

## 2025-02-10 RX ADMIN — LOSARTAN POTASSIUM 50 MG: 50 TABLET, FILM COATED ORAL at 09:18

## 2025-02-10 RX ADMIN — SODIUM CHLORIDE, SODIUM GLUCONATE, SODIUM ACETATE, POTASSIUM CHLORIDE, MAGNESIUM CHLORIDE, SODIUM PHOSPHATE, DIBASIC, AND POTASSIUM PHOSPHATE 100 ML/HR: .53; .5; .37; .037; .03; .012; .00082 INJECTION, SOLUTION INTRAVENOUS at 02:35

## 2025-02-10 RX ADMIN — ENOXAPARIN SODIUM 40 MG: 40 INJECTION SUBCUTANEOUS at 09:18

## 2025-02-10 NOTE — ASSESSMENT & PLAN NOTE
Patient has no history of diverticulitis.  By her report, she had colonoscopy 5 years ago and was told it was normal.  Patient will need repeat colonoscopy in a few weeks to assess for evidence of diverticulosis.  Antibiotic plan as in above.  Recommend repeat colonoscopy in a few weeks.

## 2025-02-10 NOTE — ASSESSMENT & PLAN NOTE
CT also noted with findings below  4 mm hypodense lesion in the pancreatic body.  For simple cyst(s) less than 1.5 cm, recommend follow-up every 2 years for 5 times or to age 80, whichever comes first.  Follow-up can stop at age 80 or can switch over to 80 year or older algorithm.  Recommend next follow-up in 2 years.  Preferred imaging modality: abdomen MRI and MRCP with and without IV contrast, or triple phase abdomen CT with IV contrast, or abdomen MRI and MRCP without IV contrast.

## 2025-02-10 NOTE — CONSULTS
Consultation - Infectious Disease   Name: Miley Locke 69 y.o. female I MRN: [id number]  Unit/Bed#:  I Date of Admission: 2/9/2025   Date of Service: 2/10/2025 I Hospital Day: 0   Inpatient consult to Infectious Diseases  Consult performed by: Fabrizio Velarde MD  Consult ordered by: Ana Ferguson MD        Physician Requesting Evaluation: Anthony Ca, DO   Reason for Evaluation / Principal Problem: Diverticulitis with abscess.      Administrative Statements   VIRTUAL CARE DOCUMENTATION:     1. This service was provided via Telemedicine using MCE-5 Development Kit     2. Parties in the room with patient during teleconsult Patient only    3. Confidentiality My office door was closed     4. Participants No one else was in the room    5. Patient acknowledged consent and understanding of privacy and security of the  Telemedicine consult. I informed the patient that I have reviewed their record in Epic and presented the opportunity for them to ask any questions regarding the visit today.  The patient agreed to participate.    6. I have spent a total time of 60 minutes in caring for this patient on the day of the visit/encounter including Diagnostic results, Impressions, Counseling / Coordination of care, Documenting in the medical record, Reviewing / ordering tests, medicine, procedures  , Obtaining or reviewing history  , and Communicating with other healthcare professionals , not including the time spent for establishing the audio/video connection.      Assessment & Plan  Colonic diverticular abscess  Patient presented with abdominal pain, with CT showing sigmoid diverticulitis and a peridiverticular abscess.  Patient notes already clinically much improved on antibiotics and remains systemically well.  Surgery evaluated patient and felt that surgery is not a necessary.  Although patient is clinically improved on antibiotics and her abscess will likely resolve with antibiotics alone, patient would need to stay  on antibiotic for an extended period of time until the abscess is resolved completely on repeat imaging, which may take 2 to 3 months.  If her abscess is able to be drained percutaneously, she would only need to be antibiotic for brief course afterwards.  Continue ceftriaxone/Flagyl for now.  Monitor temperature/WBC.  Monitor abdominal pain.  Transition to p.o. Augmentin when patient is further clinically improved.  Recommend IR consultation to assess for percutaneous drainage of pelvic abscess.  If IR is unable to drain abscess, patient will need to stay on antibiotic until abscess is resolved completely on repeat imaging.  Diverticulitis  Patient has no history of diverticulitis.  By her report, she had colonoscopy 5 years ago and was told it was normal.  Patient will need repeat colonoscopy in a few weeks to assess for evidence of diverticulosis.  Antibiotic plan as in above.  Recommend repeat colonoscopy in a few weeks.  Abnormal CT of the abdomen  Patient also has a hypodense lesion noted on pancreas on CT.  Follow-up per PCP and primary service.    Prior records reviewed in detail.  Discussed with patient in detail regarding the above plan.  Discussed with Lorelei Degroot PA-C from primary service regarding the plan to continue antibiotic and ask IR to evaluate percutaneous drainage.  I have discussed with Lorelei Benson PA-C from primary service the above plan to continue current antibiotics and ask IR to evaluate for percutaneous drainage.  She agrees with the plan.    Antibiotics:  Ceftriaxone/Flagyl # 2    History of Present Illness   Miley Locke is a 69 y.o. year old female, relatively healthy, who presented to ER yesterday for 1 week history of abdominal cramps, bloating and constipation.  She was seen at a local urgent care center 5 days ago and was discharged home with conservative management.  On presentation, patient did not have fever or leukocytosis.  However, abdomen/pelvis CT show distal colon  diverticulitis with small peridiverticular abscess.  Patient was started on ceftriaxone/Flagyl.  We are asked to evaluate the patient.    At present, patient states that she feels a little better.  She is still constipated but abdominal cramp is improved.  Patient denies fever, chills or systemic symptoms.  She denies prior diverticulitis.  She does not recall ever being told that she had diverticulosis.  Patient had colonoscopy approximate 5 years ago within the [facility] system.  She states that she was told it was normal.    Of note, patient had distant hysterectomy.  She had an episode of SBO approximately 10 years ago, which resolved with supportive care.  In fact, patient presented to ER because she thought she had another episode of SBO.    A complete review of systems is negative other than that noted in the HPI.    I have reviewed the patient's PMH, PSH, Social History, Family History, Meds, and Allergies    Objective :  Temp:  [98 °F (36.7 °C)-99.2 °F (37.3 °C)] 98.4 °F (36.9 °C)  HR:  [] 69  BP: (109-140)/(59-71) 140/64  Resp:  [16-18] 18  SpO2:  [94 %-97 %] 97 %  O2 Device: None (Room air)    Physical exam has been primarily done by the patient's nurse and/or the primary service due to limited examination abilities on telemedicine.    General:  No acute distress  Psychiatric:  Awake and alert  Pulmonary:  Normal respiratory excursion without accessory muscle use  Abdomen:  Soft, nondistended, mild lower abdominal tenderness, bowel sounds present  Extremities:  No edema  Skin:  No rashes      Lab Results: I have reviewed the following results:  Results from last 7 days   Lab Units 02/10/25  0647 02/09/25  1730   WBC Thousand/uL 8.80 9.77   HEMOGLOBIN g/dL 12.6 13.9   PLATELETS Thousands/uL 236 270     Results from last 7 days   Lab Units 02/10/25  0647 02/09/25  1730   SODIUM mmol/L 140 136   POTASSIUM mmol/L 3.7 3.0*   CHLORIDE mmol/L 104 96   CO2 mmol/L 26 28   BUN mg/dL 11 14   CREATININE mg/dL  0.97 1.13   EGFR ml/min/1.73sq m 58 49   CALCIUM mg/dL 8.9 9.6   AST U/L 10* 12*   ALT U/L 7 9   ALK PHOS U/L 63 68   ALBUMIN g/dL 3.7 4.2     Results from last 7 days   Lab Units 02/09/25 1929   BLOOD CULTURE  Received in Microbiology Lab. Culture in Progress.  Received in Microbiology Lab. Culture in Progress.                       Imaging Results Review: I personally reviewed the following image studies in PACS and associated radiology reports: CT abdomen/pelvis. My interpretation of the radiology images/reports is: CT with distal sigmoid diverticulitis and an adjacent abscess approximately 3 cm in size.  There was also noted a small hypodense lesion in the pancreas..

## 2025-02-10 NOTE — ASSESSMENT & PLAN NOTE
69-year-old female with previous history of total abdominal hysterectomy in the past was seen at urgent care in [place] 5 days ago because of abdominal cramping, decreased appetite and constipation.  She was discharged from urgent care and told to take Gas-X 4 times a day which did not improve her abdominal cramping and bloating.  She presented here to the ED on Saturday, 2 days ago.  Denied any fever or chills.  No previous history of similar symptoms.  Last colonoscopy was 5 years ago.  She was not told in the past that she had diverticulosis.  She was hospitalized here 10 or 12 years ago with small bowel obstruction resolved with conservative nonoperative management, NG tube decompression.    Patient admitted to medicine service with sigmoid diverticulitis, CT results below.  Noted abscess formation.    She denies any vaginal discharge.  No fever or chills.    CTAP +distal sigmoid diverticulitis complicated by 3 cm abscess which appears to project inferiorly from the sigmoid colon and is inseparable from the vaginal cuff status post hysterectomy.  Possible [facility] vaginal fistula.  Patient denies any vaginal discharge.    Labs reviewed, no leukocytosis.  WBC today 8.80.  Hemoglobin 12.6  CMP normal electrolytes.  UA trace occult blood.  Obtained, pendingBC x 2   Lactic acid 1.5  Lipase 29    VSS hemodynamically stable and afebrile.    Weight BMI 31.88    PLAN:    No indication for any general surgical intervention at this time, will continue to follow  Clear liquids as tolerated  Analgesics and antiemetics as ordered as needed  Continue present IV ceftriaxone and metronidazole  Repeat a.m. labs including CBC, BMP, magnesium  Monitor and replete electrolytes as needed  Serial abdominal exams  Discussed and updated in person with Dr. Clint Mendoza, he will examine the patient later today.  Secure chat message sent to Dr. Chinmay Tay from , he will examine CT abdomen pelvis whether or not diverticular abscess  would be amenable to drainage.  Hopefully the patient will improve with nonoperative management, IV antibiotics, bowel rest  Eventual outpatient colonoscopy by GI in about 6 weeks after current bout of diverticulitis has resolved and she completed antibiotic treatment.

## 2025-02-10 NOTE — ASSESSMENT & PLAN NOTE
Pt presenting with constipation and gas pain found to have the findings below on CT  Distal sigmoid diverticulitis complicated by 3 cm abscess.   Note that the abscess projects inferiorly from the sigmoid colon and is inseparable from the vaginal cuff status post hysterectomy.  Correlate clinically for any vaginal discharge to suggest a fistula.   Fluid contents within the cecum and distal small bowel, consistent with a diarrheal GI illness versus mild enterocolitis.  Possibly reactive to the above findings.    PT seen with diverticulitis and 3 cm abscess  Will continue with IV abx with Rocephin and flagyl  Pt has been afebrile though admits she had an episode of fever to 101 about 3 days ago  No leukocytosis  General surgery determined no need for surgical intervention at this time. Will wait to see if IR will be able to drain abscess tomorrow.    Hopefully can clear with IV abx   ID consult  Start clear liquid diet today, gradually advance up to regular diet as tolerated.    Analgesia for pain control  IV fluids for hydration

## 2025-02-10 NOTE — PLAN OF CARE
Problem: NEUROSENSORY - ADULT  Goal: Achieves stable or improved neurological status  Description: INTERVENTIONS  - Monitor and report changes in neurological status  - Monitor vital signs such as temperature, blood pressure, glucose, and any other labs ordered   - Initiate measures to prevent increased intracranial pressure  - Monitor for seizure activity and implement precautions if appropriate      Outcome: Progressing  Goal: Achieves maximal functionality and self care  Description: INTERVENTIONS  - Monitor swallowing and airway patency with patient fatigue and changes in neurological status  - Encourage and assist patient to increase activity and self care.   - Encourage visually impaired, hearing impaired and aphasic patients to use assistive/communication devices  Outcome: Progressing     Problem: CARDIOVASCULAR - ADULT  Goal: Maintains optimal cardiac output and hemodynamic stability  Description: INTERVENTIONS:  - Monitor I/O, vital signs and rhythm  - Monitor for S/S and trends of decreased cardiac output  - Administer and titrate ordered vasoactive medications to optimize hemodynamic stability  - Assess quality of pulses, skin color and temperature  - Assess for signs of decreased coronary artery perfusion  - Instruct patient to report change in severity of symptoms  Outcome: Progressing  Goal: Absence of cardiac dysrhythmias or at baseline rhythm  Description: INTERVENTIONS:  - Continuous cardiac monitoring, vital signs, obtain 12 lead EKG if ordered  - Administer antiarrhythmic and heart rate control medications as ordered  - Monitor electrolytes and administer replacement therapy as ordered  Outcome: Progressing     Problem: RESPIRATORY - ADULT  Goal: Achieves optimal ventilation and oxygenation  Description: INTERVENTIONS:  - Assess for changes in respiratory status  - Assess for changes in mentation and behavior  - Position to facilitate oxygenation and minimize respiratory effort  - Oxygen  administered by appropriate delivery if ordered  - Initiate smoking cessation education as indicated  - Encourage broncho-pulmonary hygiene including cough, deep breathe, Incentive Spirometry  - Assess the need for suctioning and aspirate as needed  - Assess and instruct to report SOB or any respiratory difficulty  - Respiratory Therapy support as indicated  Outcome: Progressing     Problem: GASTROINTESTINAL - ADULT  Goal: Minimal or absence of nausea and/or vomiting  Description: INTERVENTIONS:  - Administer IV fluids if ordered to ensure adequate hydration  - Maintain NPO status until nausea and vomiting are resolved  - Nasogastric tube if ordered  - Administer ordered antiemetic medications as needed  - Provide nonpharmacologic comfort measures as appropriate  - Advance diet as tolerated, if ordered  - Consider nutrition services referral to assist patient with adequate nutrition and appropriate food choices  Outcome: Progressing  Goal: Maintains or returns to baseline bowel function  Description: INTERVENTIONS:  - Assess bowel function  - Encourage oral fluids to ensure adequate hydration  - Administer IV fluids if ordered to ensure adequate hydration  - Administer ordered medications as needed  - Encourage mobilization and activity  - Consider nutritional services referral to assist patient with adequate nutrition and appropriate food choices  Outcome: Not Progressing  Goal: Maintains adequate nutritional intake  Description: INTERVENTIONS:  - Monitor percentage of each meal consumed  - Identify factors contributing to decreased intake, treat as appropriate  - Assist with meals as needed  - Monitor I&O, weight, and lab values if indicated  - Obtain nutrition services referral as needed  Outcome: Not Progressing  Goal: Oral mucous membranes remain intact  Description: INTERVENTIONS  - Assess oral mucosa and hygiene practices  - Implement preventative oral hygiene regimen  - Implement oral medicated treatments as  ordered  - Initiate Nutrition services referral as needed  Outcome: Progressing     Problem: GENITOURINARY - ADULT  Goal: Maintains or returns to baseline urinary function  Description: INTERVENTIONS:  - Assess urinary function  - Encourage oral fluids to ensure adequate hydration if ordered  - Administer IV fluids as ordered to ensure adequate hydration  - Administer ordered medications as needed  - Offer frequent toileting  - Follow urinary retention protocol if ordered  Outcome: Progressing  Goal: Absence of urinary retention  Description: INTERVENTIONS:  - Assess patient’s ability to void and empty bladder  - Monitor I/O  - Bladder scan as needed  - Discuss with physician/AP medications to alleviate retention as needed  - Discuss catheterization for long term situations as appropriate  Outcome: Progressing     Problem: METABOLIC, FLUID AND ELECTROLYTES - ADULT  Goal: Electrolytes maintained within normal limits  Description: INTERVENTIONS:  - Monitor labs and assess patient for signs and symptoms of electrolyte imbalances  - Administer electrolyte replacement as ordered  - Monitor response to electrolyte replacements, including repeat lab results as appropriate  - Instruct patient on fluid and nutrition as appropriate  Outcome: Not Progressing  Goal: Fluid balance maintained  Description: INTERVENTIONS:  - Monitor labs   - Monitor I/O and WT  - Instruct patient on fluid and nutrition as appropriate  - Assess for signs & symptoms of volume excess or deficit  Outcome: Progressing  Goal: Glucose maintained within target range  Description: INTERVENTIONS:  - Monitor Blood Glucose as ordered  - Assess for signs and symptoms of hyperglycemia and hypoglycemia  - Administer ordered medications to maintain glucose within target range  - Assess nutritional intake and initiate nutrition service referral as needed  Outcome: Progressing     Problem: SKIN/TISSUE INTEGRITY - ADULT  Goal: Skin Integrity remains intact(Skin  Breakdown Prevention)  Description: Assess:  -Perform Drake assessment every shift  -Clean and moisturize skin every shift  -Inspect skin when repositioning, toileting, and assisting with ADLS  -Assess under medical devices such as  every   -Assess extremities for adequate circulation and sensation     Bed Management:  -Have minimal linens on bed & keep smooth, unwrinkled  -Change linens as needed when moist or perspiring  -Avoid sitting or lying in one position for more than 2 hours while in bed  -Keep HOB at 45 degrees     Toileting:  -Offer bedside commode  -Assess for incontinence every shift  -Use incontinent care products after each incontinent episode such as     Activity:  -Mobilize patient 3 times a day  -Encourage activity and walks on unit  -Encourage or provide ROM exercises   -Turn and reposition patient every 2 Hours  -Use appropriate equipment to lift or move patient in bed  -Instruct/ Assist with weight shifting every shift when out of bed in chair  -Consider limitation of chair time 2 hour intervals    Skin Care:  -Avoid use of baby powder, tape, friction and shearing, hot water or constrictive clothing  -Relieve pressure over bony prominences using   -Do not massage red bony areas    Next Steps:  -Teach patient strategies to minimize risks such as    -Consider consults to  interdisciplinary teams such as   Outcome: Progressing     Problem: HEMATOLOGIC - ADULT  Goal: Maintains hematologic stability  Description: INTERVENTIONS  - Assess for signs and symptoms of bleeding or hemorrhage  - Monitor labs  - Administer supportive blood products/factors as ordered and appropriate  Outcome: Progressing

## 2025-02-10 NOTE — H&P (VIEW-ONLY)
Interventional Radiology  Consultation 2/10/2025     Inpatient Consult to IR  Consult performed by: Chinmay Tay MD  Consult ordered by: Tariq Rodriguez PA-C        Miley Locke   1955   [id number]      Assessment/Plan:  Assessment is pelvic abscess.  No good window for percutaneous drainage.  As previously discussed in her chart, transvaginal drainage is probably the best approach.  [facility] does not have the transvaginal probe software and disposables for an ultrasound-guided transvaginal procedure.  Let me assess the patient tomorrow, we may be able to do a transvaginal drainage with with CT guidance.  If the trajectory is off, then we will have to transfer somewhere with appropriate ultrasound hardware and software for Transvaginal guided procedures.    I will make her n.p.o. for tomorrow.    I spent about 10 minutes reviewing the chart for this consult.    Medical Problems       Problem List       * (Principal) Diverticulitis    GERD (gastroesophageal reflux disease)    Heart murmur    History of partial knee replacement    Overview Signed 2/7/2025 12:43 PM by JENA Berger   Right knee         Prediabetes    Dyslipidemia    Constipation    Abnormal CT of the abdomen    Hypertension    Hypokalemia    Colonic diverticular abscess            Subjective:     Patient ID: iMley Locke is a 69 y.o. female.    History of Present Illness  Admitted, and subsequently found to have an abscess.    Review of Systems      Past Medical History:   Diagnosis Date    Acid reflux     Bowel obstruction (HCC)     Hypertension         Past Surgical History:   Procedure Laterality Date    HYSTERECTOMY      JOINT REPLACEMENT      KNEE ARTHROCENTESIS          Social History     Tobacco Use   Smoking Status Never   Smokeless Tobacco Never        Social History     Substance and Sexual Activity   Alcohol Use Never        Social History     Substance and Sexual Activity   Drug Use Never        Allergies    Allergen Reactions    Aspirin Edema and Swelling     edema       Current Facility-Administered Medications   Medication Dose Route Frequency Provider Last Rate Last Admin    acetaminophen (TYLENOL) tablet 650 mg  650 mg Oral Q6H PRN Ana Ferguson MD        atorvastatin (LIPITOR) tablet 20 mg  20 mg Oral Daily With Dinner Ana Ferguson MD   20 mg at 02/10/25 1824    cefTRIAXone (ROCEPHIN) IVPB (premix in dextrose) 2,000 mg 50 mL  2,000 mg Intravenous Q24H Ana Ferguson  mL/hr at 02/10/25 1824 2,000 mg at 02/10/25 1824    Cholecalciferol (VITAMIN D3) tablet 2,000 Units  2,000 Units Oral Daily Ana Ferguson MD   2,000 Units at 02/10/25 0918    docusate sodium (COLACE) capsule 100 mg  100 mg Oral BID Tariq Rodriguez PA-C   100 mg at 02/10/25 1824    enoxaparin (LOVENOX) subcutaneous injection 40 mg  40 mg Subcutaneous Daily Ana Ferguson MD   40 mg at 02/10/25 0918    hydroCHLOROthiazide tablet 12.5 mg  12.5 mg Oral Daily Ana Ferguson MD   12.5 mg at 02/10/25 0918    losartan (COZAAR) tablet 50 mg  50 mg Oral Daily Ana Ferguson MD   50 mg at 02/10/25 0918    metroNIDAZOLE (FLAGYL) IVPB (premix) 500 mg 100 mL  500 mg Intravenous Q8H Ana Ferguson  mL/hr at 02/10/25 1155 500 mg at 02/10/25 1155    morphine injection 4 mg  4 mg Intravenous Q4H PRN Ana Ferguson MD        multi-electrolyte (PLASMALYTE-A/ISOLYTE-S PH 7.4) IV solution  100 mL/hr Intravenous Continuous Ana Ferguson  mL/hr at 02/10/25 1430 100 mL/hr at 02/10/25 1430    ondansetron (ZOFRAN) injection 4 mg  4 mg Intravenous Q6H PRN Ana Ferguson MD        pantoprazole (PROTONIX) EC tablet 40 mg  40 mg Oral Daily Before Breakfast Ana V. Jegede, MD   40 mg at 02/10/25 0647          Objective:    Vitals:    02/09/25 2100 02/09/25 2120 02/10/25 0858 02/10/25 1437   BP: 132/71 137/62 140/64 125/59   BP Location: Left arm Left arm Right arm Right arm   Pulse: 86 84 69 63  "  Resp: 18 18 18 17   Temp: 98.1 °F (36.7 °C) 99.2 °F (37.3 °C) 98.4 °F (36.9 °C) 99.3 °F (37.4 °C)   TempSrc: Temporal Tympanic Temporal Temporal   SpO2: 95% 97% 97% 98%   Weight:  86.9 kg (191 lb 9.6 oz)     Height:  5' 5\" (1.651 m)          Physical Exam      No results found for: \"BNP\"   Lab Results   Component Value Date    WBC 8.80 02/10/2025    HGB 12.6 02/10/2025    HCT 36.4 02/10/2025    MCV 91 02/10/2025     02/10/2025     Lab Results   Component Value Date    INR 0.91 08/10/2022    INR 0.94 05/13/2015    PROTIME 12.5 08/10/2022    PROTIME 12.3 05/13/2015     Lab Results   Component Value Date    PTT 26 08/10/2022         I have personally reviewed pertinent imaging and laboratory results.     Code Status: Level 1 - Full Code  Advance Directive and Living Will:      Power of :    POLST:      IR has been consulted to evaluate the patient, determine the appropriate procedure, and whether or not a procedure can and should be performed.    Thank you for allowing me to participate in the care of Miley Locke. Please don't hesitate to call, text, email, or TigerText with any questions.     This text is generated with voice recognition software. There may be translation, syntax,  or grammatical errors. If you have any questions, please contact the dictating provider.        "

## 2025-02-10 NOTE — CASE MANAGEMENT
Case Management Progress Note    Patient name Miley Locke  Location / MRN [id number]  : 1955 Date 2/10/2025       LOS (days): 0  Geometric Mean LOS (GMLOS) (days):   Days to GMLOS:        OBJECTIVE:        Current admission status: Inpatient  Preferred Pharmacy:   [id number] - [place] - [address]  [address] [address]  Phone: [phone] Fax: [phone]    Primary Care Provider: No primary care provider on file.    Primary Insurance: MEDICARE  Secondary Insurance: BLUE CROSS    PROGRESS NOTE:chart reviewed. Pt admitted with diverticulitis. No current discharge needs. CM to follow.

## 2025-02-10 NOTE — ASSESSMENT & PLAN NOTE
Patient presented with abdominal pain, with CT showing sigmoid diverticulitis and a peridiverticular abscess.  Patient notes already clinically much improved on antibiotics and remains systemically well.  Surgery evaluated patient and felt that surgery is not a necessary.  Although patient is clinically improved on antibiotics and her abscess will likely resolve with antibiotics alone, patient would need to stay on antibiotic for an extended period of time until the abscess is resolved completely on repeat imaging, which may take 2 to 3 months.  If her abscess is able to be drained percutaneously, she would only need to be antibiotic for brief course afterwards.  Continue ceftriaxone/Flagyl for now.  Monitor temperature/WBC.  Monitor abdominal pain.  Transition to p.o. Augmentin when patient is further clinically improved.  Recommend IR consultation to assess for percutaneous drainage of pelvic abscess.  If IR is unable to drain abscess, patient will need to stay on antibiotic until abscess is resolved completely on repeat imaging.

## 2025-02-10 NOTE — ASSESSMENT & PLAN NOTE
Continue with protonix 40 mg daily   Per Dr. Bridges, patient okay to discharge at baseline O2 saturation of 89-90%.

## 2025-02-10 NOTE — ASSESSMENT & PLAN NOTE
Patient also has a hypodense lesion noted on pancreas on CT.  Follow-up per PCP and primary service.    Prior records reviewed in detail.  Discussed with patient in detail regarding the above plan.  Discussed with Lorelei Degroot PA-C from primary service regarding the plan to continue antibiotic and ask IR to evaluate percutaneous drainage.

## 2025-02-10 NOTE — ASSESSMENT & PLAN NOTE
Pt presenting with constipation and gas pain found to have the findings below on CT  Distal sigmoid diverticulitis complicated by 3 cm abscess.   Note that the abscess projects inferiorly from the sigmoid colon and is inseparable from the vaginal cuff status post hysterectomy.  Correlate clinically for any vaginal discharge to suggest a fistula.   Fluid contents within the cecum and distal small bowel, consistent with a diarrheal GI illness versus mild enterocolitis.  Possibly reactive to the above findings.    PT seen with diverticulitis and 3 cm abscess  Will continue with IV abx with Rocephin and flagyl  Pt has been afebrile though admits she had an episode of fever to 101 about 3 days ago  No leukocytosis  Gen surgery feels may not need IR or surgical intervention as too small  Hopefully can clear with IV abx  ID consult  Keeping NPO for now  If no improvement or worsening clinical symptoms or signs, may need IR drainage. I have been told IR will be here on Tuesday.   Analgesia for pain control  IV fluids for hydration

## 2025-02-10 NOTE — H&P
H&P - Hospitalist   Name: Miley Locke 69 y.o. female I MRN: [id number]  Unit/Bed#:  I Date of Admission: 2/9/2025   Date of Service: 2/9/2025 I Hospital Day: 0     Assessment & Plan  Diverticulitis  Pt presenting with constipation and gas pain found to have the findings below on CT  Distal sigmoid diverticulitis complicated by 3 cm abscess.   Note that the abscess projects inferiorly from the sigmoid colon and is inseparable from the vaginal cuff status post hysterectomy.  Correlate clinically for any vaginal discharge to suggest a fistula.   Fluid contents within the cecum and distal small bowel, consistent with a diarrheal GI illness versus mild enterocolitis.  Possibly reactive to the above findings.    PT seen with diverticulitis and 3 cm abscess  Will continue with IV abx with Rocephin and flagyl  Pt has been afebrile though admits she had an episode of fever to 101 about 3 days ago  No leukocytosis  Gen surgery feels may not need IR or surgical intervention as too small  Hopefully can clear with IV abx  ID consult  Keeping NPO for now  If no improvement or worsening clinical symptoms or signs, may need IR drainage. I have been told IR will be here on Tuesday.   Analgesia for pain control  IV fluids for hydration  GERD (gastroesophageal reflux disease)  Continue with protonix 40 mg daily  Prediabetes  Monitor FS   Dyslipidemia  Continue with home dose of atorvastatin  Constipation  Pt with known history of SBO, last BM was last night. No stool burden on CT  Abnormal CT of the abdomen  CT also noted with findings below  4 mm hypodense lesion in the pancreatic body.  For simple cyst(s) less than 1.5 cm, recommend follow-up every 2 years for 5 times or to age 80, whichever comes first.  Follow-up can stop at age 80 or can switch over to 80 year or older algorithm.  Recommend next follow-up in 2 years.  Preferred imaging modality: abdomen MRI and MRCP with and without IV contrast, or triple phase abdomen  CT with IV contrast, or abdomen MRI and MRCP without IV contrast.  Hypertension  Resume home mes, pt on losartan and HCTZ      VTE Pharmacologic Prophylaxis:   High Risk (Score >/= 5) - Pharmacological DVT Prophylaxis Ordered: enoxaparin (Lovenox). Sequential Compression Devices Ordered.  Code Status: Level 1 - Full Code   Anticipated Length of Stay: Patient will be admitted on an observation basis with an anticipated length of stay of less than 2 midnights secondary to diverticulitis with abscess.        History of Present Illness     HPI:  Miley Locke is a 69 y.o. female with pmhx of HTN, SBO and GERD who presents to the ED with complaints of abdominal discomfort  and constipation for the past 1 wk. Her abdominal discomfort is more generalized but then states localizes to the left lower quadrant. No vomiting. She was seen at the urgent care 2 days ago where she was prescribed gas X and peptobismol and told to come to the ED if symptoms worsens. She felt a little better on Thursday though she had fever either on Wednesday or Thursday.  Pt states she did not feel improved and with increasing pain and decreased BM and hence presented today. She is not sure if her decreased BM is due to poor oral intake as well. She was very concern because of her history of SBO. In the ED she had a CT done showing evidence of sigmoid diverticulitis and abscess. Gen surgery recommended medicine admit and no acute intervention. Pt given IV abx in the ED.       Historical Information   Past Medical History:   Diagnosis Date    Acid reflux     Bowel obstruction (HCC)     Hypertension      Patient Active Problem List   Diagnosis    GERD (gastroesophageal reflux disease)    Heart murmur    History of partial knee replacement    Prediabetes    Dyslipidemia    Constipation    Diverticulitis     Past Surgical History:   Procedure Laterality Date    HYSTERECTOMY      JOINT REPLACEMENT      KNEE ARTHROCENTESIS         Social History    Social History     Substance and Sexual Activity   Alcohol Use Never     Social History     Substance and Sexual Activity   Drug Use Never     Social History     Tobacco Use   Smoking Status Never   Smokeless Tobacco Never       Family History: History reviewed. No pertinent family history.    Meds/Allergies       Current Facility-Administered Medications:     acetaminophen (TYLENOL) tablet 650 mg, 650 mg, Oral, Q6H PRN, Ana Ferguson MD    [START ON 2/10/2025] atorvastatin (LIPITOR) tablet 20 mg, 20 mg, Oral, Daily With Dinner, Ana Ferguson MD    [START ON 2/10/2025] cefTRIAXone (ROCEPHIN) IVPB (premix in dextrose) 2,000 mg 50 mL, 2,000 mg, Intravenous, Q24H, Ana Ferguson MD    [START ON 2/10/2025] Cholecalciferol (VITAMIN D3) tablet 2,000 Units, 2,000 Units, Oral, Daily, Ana Ferguson MD    [START ON 2/10/2025] enoxaparin (LOVENOX) subcutaneous injection 40 mg, 40 mg, Subcutaneous, Daily, Ana Ferguson MD    [START ON 2/10/2025] hydroCHLOROthiazide tablet 12.5 mg, 12.5 mg, Oral, Daily, Ana Ferguson MD    [START ON 2/10/2025] losartan (COZAAR) tablet 50 mg, 50 mg, Oral, Daily, Ana Ferguson MD    [START ON 2/10/2025] metroNIDAZOLE (FLAGYL) IVPB (premix) 500 mg 100 mL, 500 mg, Intravenous, Q8H, Ana Ferguson MD    morphine injection 4 mg, 4 mg, Intravenous, Q4H PRN, Ana Ferguson MD    multi-electrolyte (PLASMALYTE-A/ISOLYTE-S PH 7.4) IV solution, 100 mL/hr, Intravenous, Continuous, Ana Ferguson MD    ondansetron (ZOFRAN) injection 4 mg, 4 mg, Intravenous, Q6H PRN, Ana Ferguson MD    [START ON 2/10/2025] pantoprazole (PROTONIX) EC tablet 40 mg, 40 mg, Oral, Daily Before Breakfast, Ana Ferguson MD    Allergies   Allergen Reactions    Aspirin Edema and Swelling     edema       Review of Systems  A detailed 12 point review of systems was conducted and is negative apart from those mentioned in the HPI.        Objective   Vitals: Blood  "pressure 137/62, pulse 84, temperature 99.2 °F (37.3 °C), temperature source Tympanic, resp. rate 18, height 5' 5\" (1.651 m), weight 86.9 kg (191 lb 9.6 oz), SpO2 97%.    Physical Exam   General- Awake and alert, NAD  HEENT: PERRLA, EOMI, sclera anicteric, dry mucous membranes, tongue mucosa dry without lesions.  Neck: supple, no JVD, lymphadenopathy, thyromegaly.  Heart: Regular rate and rhythm, S1S2 present.  No murmur, rub or gallop.    Lungs; Clear to auscultation bilaterally.  No wheezing, crackles or rhonchi.  No accessory muscle use or respiratory distress.  Abdomen: soft, non-tender, non-distended, NABS.  No guarding or rebound. No peritoneal sound or mass.  Extremities: no clubbing, cyanosis, or edema.  2+ pedal pulses bilaterally.  Full range of motion.  Neurologic:  Cranial nerves II-XII intact.  Strength and sensation globally intact. Speech fluent and goal directed.  Awake, alert and oriented x 3.  Skin: warm and dry. No petechiae, purpura or rash.    Lab Results:   Results from last 7 days   Lab Units 02/09/25  1730   WBC Thousand/uL 9.77   HEMOGLOBIN g/dL 13.9   HEMATOCRIT % 40.5   PLATELETS Thousands/uL 270     Results from last 7 days   Lab Units 02/09/25  1730   POTASSIUM mmol/L 3.0*   CHLORIDE mmol/L 96   CO2 mmol/L 28   BUN mg/dL 14   CREATININE mg/dL 1.13   CALCIUM mg/dL 9.6         Imaging:  CT abdomen pelvis with contrast   Final Result by Pilo Garcia MD (02/09 1917)      Distal sigmoid diverticulitis complicated by 3 cm abscess.      Note that the abscess projects inferiorly from the sigmoid colon and is inseparable from the vaginal cuff status post hysterectomy.   Correlate clinically for any vaginal discharge to suggest a fistula.      Fluid contents within the cecum and distal small bowel, consistent with a diarrheal GI illness versus mild enterocolitis.   Possibly reactive to the above findings.      4 mm hypodense lesion in the pancreatic body.   For simple cyst(s) less than " "1.5 cm, recommend follow-up every 2 years for 5 times or to age 80, whichever comes first.   Follow-up can stop at age 80 or can switch over to 80 year or older algorithm.   Recommend next follow-up in 2 years.   Preferred imaging modality: abdomen MRI and MRCP with and without IV contrast, or triple phase abdomen CT with IV contrast, or abdomen MRI and MRCP without IV contrast.      Findings were discussed with Dr. Smith from the ED at 7:10 p.m. on 2/9/2025         Workstation performed: [id number]             Code Status: Level 1 - Full Code      Counseling / Coordination of Care  Total floor / unit time spent today 70 minutes.  Greater than 50% of total time was spent with the patient and / or family counseling and / or coordination of care.      Portions of the record may have been created with voice recognition software. Occasional wrong word or \"sound a like\" substitutions may have occurred due to the inherent limitations of voice recognition software. Read the chart carefully and recognize, using context, where substitutions have occurred.  "

## 2025-02-10 NOTE — CONSULTS
Consultation - Surgery-General   Name: Miley Locke 69 y.o. female I MRN: [id number]  Unit/Bed#:  I Date of Admission: 2/9/2025   Date of Service: 2/10/2025 I Hospital Day: 0   Inpatient consult to Acute Care Surgery  Consult performed by: Tariq Rodriguez PA-C  Consult ordered by: Ana Ferguson MD        Physician Requesting Evaluation: Anthony Ruvalcaba Counts, DO   Reason for Evaluation / Principal Problem: Sigmoid diverticulitis with abscess      Assessment & Plan  Diverticulitis    69-year-old female with previous history of total abdominal hysterectomy in the past was seen at urgent care in [place] 5 days ago because of abdominal cramping, decreased appetite and constipation.  She was discharged from urgent care and told to take Gas-X 4 times a day which did not improve her abdominal cramping and bloating.  She presented here to the ED on Saturday, 2 days ago.  Denied any fever or chills.  No previous history of similar symptoms.  Last colonoscopy was 5 years ago.  She was not told in the past that she had diverticulosis.  She was hospitalized here 10 or 12 years ago with small bowel obstruction resolved with conservative nonoperative management, NG tube decompression.    Patient admitted to medicine service with sigmoid diverticulitis, CT results below.  Noted abscess formation.    She denies any vaginal discharge.  No fever or chills.    CTAP +distal sigmoid diverticulitis complicated by 3 cm abscess which appears to project inferiorly from the sigmoid colon and is inseparable from the vaginal cuff status post hysterectomy.  Possible [place] vaginal fistula.  Patient denies any vaginal discharge.    Labs reviewed, no leukocytosis.  WBC today 8.80.  Hemoglobin 12.6  CMP normal electrolytes.  UA trace occult blood.  Obtained, pendingBC x 2   Lactic acid 1.5  Lipase 29    VSS hemodynamically stable and afebrile.    Weight BMI 31.88    PLAN:    No indication for any general surgical intervention at this  time, will continue to follow  Clear liquids as tolerated  Analgesics and antiemetics as ordered as needed  Continue present IV ceftriaxone and metronidazole  Repeat a.m. labs including CBC, BMP, magnesium  Monitor and replete electrolytes as needed  Serial abdominal exams  Discussed and updated in person with Dr. Clint Mendoza, he will examine the patient later today.  Secure chat message sent to Dr. Chinmay Tay from , he will examine CT abdomen pelvis whether or not diverticular abscess would be amenable to drainage.  Hopefully the patient will improve with nonoperative management, IV antibiotics, bowel rest  Eventual outpatient colonoscopy by GI in about 6 weeks after current bout of diverticulitis has resolved and she completed antibiotic treatment.  GERD (gastroesophageal reflux disease)  Stable, remains on PPI.  Prediabetes  Noted, outpatient follow-up with PCP  Dyslipidemia  Management per the attending primary hospitalist service  Constipation  No bowel movement since admission.  Abnormal CT of the abdomen  As noted below.  Hypertension  Management per the attending primary service.  Hypokalemia  Initial K3.0, patient received electrolyte repletion.  Repeated K today normal 3.7  Surgery-General service will follow.    History of Present Illness     Miley Locke is a 69 y.o. female who presents abdominal cramps, bloating, constipation, concern for recurrent small bowel obstruction.  Patient was seen in urgent care last Wednesday, 5 days ago.  No improvement with Gas-X 4 times a day or Pepto-Bismol at home.  Patient proceeded to seek evaluation here in the ED on Saturday.  She has not had a bowel movement now in 2 days, claims constipation.  Last colonoscopy was 5 years ago.  Denies prior history of diverticulitis.  Past surgical history included hysterectomy.  She was hospitalized 10 or 12 years ago here with small bowel obstruction which resolved with NG tube decompression and nonoperative  management.  Patient seen here in the ER where CT abdomen pelvis showed distal sigmoid colon diverticulitis with 3 cm abscess, possible communication with the vaginal cuff status post hysterectomy.  Patient denies any vaginal drainage or bleeding.  Currently n.p.o., IV fluids and antibiotics initiated.  No leukocytosis.  Pain right now is controlled with current analgesics.  No fever or chills.    Review of Systems   Constitutional:  Negative for activity change, appetite change, chills, fever and unexpected weight change.   HENT: Negative.     Eyes: Negative.    Respiratory:  Negative for cough, shortness of breath and wheezing.    Cardiovascular:  Negative for chest pain, palpitations and leg swelling.   Gastrointestinal:  Positive for abdominal distention, abdominal pain and constipation. Negative for diarrhea, nausea and vomiting.   Endocrine: Negative.    Genitourinary:  Negative for decreased urine volume, difficulty urinating, dysuria, flank pain, pelvic pain, urgency, vaginal bleeding, vaginal discharge and vaginal pain.   Musculoskeletal: Negative.  Negative for back pain and gait problem.   Skin: Negative.    Allergic/Immunologic: Negative.    Neurological: Negative.    Hematological:  Does not bruise/bleed easily.   Psychiatric/Behavioral: Negative.     All other systems reviewed and are negative.    I have reviewed the patient's PMH, PSH, Social History, Family History, Meds, and Allergies  Historical Information   Past Medical History:   Diagnosis Date    Acid reflux     Bowel obstruction (HCC)     Hypertension      Past Surgical History:   Procedure Laterality Date    HYSTERECTOMY      JOINT REPLACEMENT      KNEE ARTHROCENTESIS       Social History     Tobacco Use    Smoking status: Never    Smokeless tobacco: Never   Vaping Use    Vaping status: Never Used   Substance and Sexual Activity    Alcohol use: Never    Drug use: Never    Sexual activity: Not on file     E-Cigarette/Vaping    E-Cigarette Use  Never User      E-Cigarette/Vaping Substances     History reviewed. No pertinent family history.  Social History     Tobacco Use    Smoking status: Never    Smokeless tobacco: Never   Vaping Use    Vaping status: Never Used   Substance and Sexual Activity    Alcohol use: Never    Drug use: Never    Sexual activity: Not on file       Current Facility-Administered Medications:     acetaminophen (TYLENOL) tablet 650 mg, Q6H PRN    atorvastatin (LIPITOR) tablet 20 mg, Daily With Dinner    cefTRIAXone (ROCEPHIN) IVPB (premix in dextrose) 2,000 mg 50 mL, Q24H    Cholecalciferol (VITAMIN D3) tablet 2,000 Units, Daily    enoxaparin (LOVENOX) subcutaneous injection 40 mg, Daily    hydroCHLOROthiazide tablet 12.5 mg, Daily    losartan (COZAAR) tablet 50 mg, Daily    metroNIDAZOLE (FLAGYL) IVPB (premix) 500 mg 100 mL, Q8H, Last Rate: 500 mg (02/10/25 0575)    morphine injection 4 mg, Q4H PRN    multi-electrolyte (PLASMALYTE-A/ISOLYTE-S PH 7.4) IV solution, Continuous, Last Rate: 100 mL/hr (02/10/25 8795)    ondansetron (ZOFRAN) injection 4 mg, Q6H PRN    pantoprazole (PROTONIX) EC tablet 40 mg, Daily Before Breakfast  Prior to Admission Medications   Prescriptions Last Dose Informant Patient Reported? Taking?   Cholecalciferol (Vitamin D3) 50 MCG (2000 UT) capsule 2/9/2025  Yes Yes   Sig: Take 2,000 Units by mouth daily   atorvastatin (LIPITOR) 20 mg tablet 2/9/2025  Yes Yes   Sig: Take 20 mg by mouth every morning   cyclobenzaprine (FLEXERIL) 10 mg tablet Not Taking  No No   Sig: Take 1 tablet (10 mg total) by mouth 3 (three) times a day as needed for muscle spasms   Patient not taking: Reported on 2/9/2025   hydroCHLOROthiazide 12.5 mg capsule 2/9/2025  Yes Yes   Sig: Take 12.5 mg by mouth daily   losartan (COZAAR) 50 mg tablet 2/9/2025  Yes Yes   Sig: Take 50 mg by mouth daily   pantoprazole (PROTONIX) 40 mg tablet 2/9/2025  Yes Yes   Sig: Take 40 mg by mouth daily      Facility-Administered Medications: None      Aspirin    Objective :  Temp:  [98 °F (36.7 °C)-99.2 °F (37.3 °C)] 98.4 °F (36.9 °C)  HR:  [] 69  BP: (109-140)/(59-71) 140/64  Resp:  [16-18] 18  SpO2:  [94 %-97 %] 97 %  O2 Device: None (Room air)      Physical Exam  Vitals reviewed.   Constitutional:       General: She is not in acute distress.     Appearance: She is not ill-appearing or toxic-appearing.   HENT:      Head: Normocephalic.      Nose: Nose normal. No congestion.      Mouth/Throat:      Pharynx: Oropharynx is clear.   Eyes:      Conjunctiva/sclera: Conjunctivae normal.   Cardiovascular:      Rate and Rhythm: Normal rate and regular rhythm.      Pulses: Normal pulses.      Heart sounds: Normal heart sounds. No murmur heard.     No gallop.   Pulmonary:      Effort: Pulmonary effort is normal. No respiratory distress.      Breath sounds: Normal breath sounds. No wheezing, rhonchi or rales.   Abdominal:      General: Bowel sounds are normal. There is no distension.      Palpations: Abdomen is soft. There is no mass.      Tenderness: There is abdominal tenderness. There is no right CVA tenderness, left CVA tenderness, guarding or rebound.      Hernia: No hernia is present.      Comments: Abdomen nondistended.  Positive bowel sounds are heard.  Midline surgical scar.  The abdomen soft, mild tenderness left lower quadrant and suprapubic area without guarding or rebound tenderness.  No masses are felt.   Musculoskeletal:      Cervical back: Normal range of motion and neck supple. No tenderness.      Right lower leg: No edema.      Left lower leg: No edema.   Skin:     Capillary Refill: Capillary refill takes less than 2 seconds.      Coloration: Skin is not pale.      Findings: No bruising, erythema, lesion or rash.   Neurological:      General: No focal deficit present.      Mental Status: She is alert and oriented to person, place, and time.      Motor: No weakness.   Psychiatric:         Mood and Affect: Mood normal.         Thought Content:  Thought content normal.         Lab Results: I have reviewed the following results:  Recent Labs     02/09/25  1730 02/10/25  0647   WBC 9.77 8.80   HGB 13.9 12.6   HCT 40.5 36.4    236   SODIUM 136 140   K 3.0* 3.7   CL 96 104   CO2 28 26   BUN 14 11   CREATININE 1.13 0.97   GLUC 112 92   AST 12* 10*   ALT 9 7   ALB 4.2 3.7   TBILI 1.23* 0.93   ALKPHOS 68 63   LACTICACID 1.5  --        Imaging Results Review: I reviewed radiology reports from this admission including: CT abdomen/pelvis.    CT ABDOMEN AND PELVIS WITH IV CONTRAST     INDICATION: Left-sided abdominal pain history of bowel obstruction.. .     COMPARISON: CT abdomen pelvis 5/13/2015     TECHNIQUE: CT examination of the abdomen and pelvis was performed. Multiplanar 2D reformatted images were created from the source data.     This examination, like all CT scans performed in the [facility], was performed utilizing techniques to minimize radiation dose exposure, including the use of iterative reconstruction and automated exposure control. Radiation dose length   product (DLP) for this visit: 831.52 mGy-cm     IV Contrast: 100 mL of iohexol  Enteric Contrast: Not administered.     FINDINGS:     ABDOMEN     LOWER CHEST:  5 mm pleural-based right upper lobe nodule (2:6).  4 mm pleural-based right lower lobe nodule (2:15).  These nodules are stable from May 2015 prior, presumed benign, no specific follow-up recommended.  No focal consolidation.  Nonspecific reticulation in the bilateral lower lobes.     LIVER/BILIARY TREE:  Stable 1 cm hypodense lesion in the left hepatic lobe consistent with a cyst.  Other subcentimeter hypodensities are too small to characterize, statistically represent cysts as well.     GALLBLADDER: No calcified gallstones. No pericholecystic inflammatory change.     SPLEEN: Unremarkable.     PANCREAS:  4 mm hypodense lesion in the pancreatic body (coronal 601:61)     ADRENAL GLANDS: Unremarkable.      KIDNEYS/URETERS: No hydronephrosis or urinary tract calculi. Subcentimeter hypoattenuating renal lesion(s), too small to characterize but statistically likely benign, which do not warrant follow-up (Radiology June 2019).     STOMACH AND BOWEL:  Distal sigmoid wall thickening with 3 cm rim-enhancing irregular hypodense collection consistent with abscess (2:153).  Note that this collection is inseparable from the adjacent vaginal cuff status post hysterectomy.     Fluid contents within a mildly distended cecum. Fluid containing nondistended distal small bowel.     Small hiatal hernia.     APPENDIX: No findings to suggest appendicitis.     ABDOMINOPELVIC CAVITY: No ascites. No pneumoperitoneum. No lymphadenopathy.     VESSELS: Atherosclerosis without abdominal aortic aneurysm.     PELVIS     REPRODUCTIVE ORGANS:  Status post hysterectomy. See bowel findings above.     URINARY BLADDER: Unremarkable.     ABDOMINAL WALL/INGUINAL REGIONS: Unremarkable.     BONES: No acute fracture or suspicious osseous lesion.        IMPRESSION:     Distal sigmoid diverticulitis complicated by 3 cm abscess.     Note that the abscess projects inferiorly from the sigmoid colon and is inseparable from the vaginal cuff status post hysterectomy.  Correlate clinically for any vaginal discharge to suggest a fistula.     Fluid contents within the cecum and distal small bowel, consistent with a diarrheal GI illness versus mild enterocolitis.  Possibly reactive to the above findings.     4 mm hypodense lesion in the pancreatic body.  For simple cyst(s) less than 1.5 cm, recommend follow-up every 2 years for 5 times or to age 80, whichever comes first.  Follow-up can stop at age 80 or can switch over to 80 year or older algorithm.  Recommend next follow-up in 2 years.  Preferred imaging modality: abdomen MRI and MRCP with and without IV contrast, or triple phase abdomen CT with IV contrast, or abdomen MRI and MRCP without IV contrast.       Other  "Study Results Review: No additional pertinent studies reviewed.    VTE Pharmacologic Prophylaxis: Enoxaparin (Lovenox)  VTE Mechanical Prophylaxis: sequential compression device    Administrative Statements   I have spent a total time of 50 minutes in caring for this patient on the day of the visit/encounter including Diagnostic results, Prognosis, Risks and benefits of tx options, Instructions for management, Patient and family education, Importance of tx compliance, Impressions, Counseling / Coordination of care, Documenting in the medical record, Reviewing / ordering tests, medicine, procedures  , Obtaining or reviewing history  , and Communicating with other healthcare professionals .    Tariq Rodriguez PA-C    **Please Note: Portions of the record may have been created using voice recognition software.  Occasional wrong word or \"sound a like\" substitutions may have occurred due to the inherent limitations of voice recognition software.  Read the chart carefully and recognize, using context, where substitutions have occurred.**      "

## 2025-02-10 NOTE — CONSULTS
Interventional Radiology  Consultation 2/10/2025     Inpatient Consult to IR  Consult performed by: Chinmay Tay MD  Consult ordered by: Tariq Rodriguez PA-C        Miley Locke   1955   [id number]      Assessment/Plan:  Assessment is pelvic abscess.  No good window for percutaneous drainage.  As previously discussed in her chart, transvaginal drainage is probably the best approach.  [facility] does not have the transvaginal probe software and disposables for an ultrasound-guided transvaginal procedure.  Let me assess the patient tomorrow, we may be able to do a transvaginal drainage with with CT guidance.  If the trajectory is off, then we will have to transfer somewhere with appropriate ultrasound hardware and software for Transvaginal guided procedures.    I will make her n.p.o. for tomorrow.    I spent about 10 minutes reviewing the chart for this consult.    Medical Problems       Problem List       * (Principal) Diverticulitis    GERD (gastroesophageal reflux disease)    Heart murmur    History of partial knee replacement    Overview Signed 2/7/2025 12:43 PM by JENA Berger   Right knee         Prediabetes    Dyslipidemia    Constipation    Abnormal CT of the abdomen    Hypertension    Hypokalemia    Colonic diverticular abscess            Subjective:     Patient ID: Miley Locke is a 69 y.o. female.    History of Present Illness  Admitted, and subsequently found to have an abscess.    Review of Systems      Past Medical History:   Diagnosis Date    Acid reflux     Bowel obstruction (HCC)     Hypertension         Past Surgical History:   Procedure Laterality Date    HYSTERECTOMY      JOINT REPLACEMENT      KNEE ARTHROCENTESIS          Social History     Tobacco Use   Smoking Status Never   Smokeless Tobacco Never        Social History     Substance and Sexual Activity   Alcohol Use Never        Social History     Substance and Sexual Activity   Drug Use Never        Allergies    Allergen Reactions    Aspirin Edema and Swelling     edema       Current Facility-Administered Medications   Medication Dose Route Frequency Provider Last Rate Last Admin    acetaminophen (TYLENOL) tablet 650 mg  650 mg Oral Q6H PRN Ana Ferguson MD        atorvastatin (LIPITOR) tablet 20 mg  20 mg Oral Daily With Dinner Ana Ferguson MD   20 mg at 02/10/25 1824    cefTRIAXone (ROCEPHIN) IVPB (premix in dextrose) 2,000 mg 50 mL  2,000 mg Intravenous Q24H Ana Ferguson  mL/hr at 02/10/25 1824 2,000 mg at 02/10/25 1824    Cholecalciferol (VITAMIN D3) tablet 2,000 Units  2,000 Units Oral Daily Ana Ferguson MD   2,000 Units at 02/10/25 0918    docusate sodium (COLACE) capsule 100 mg  100 mg Oral BID Tariq Rodriguez PA-C   100 mg at 02/10/25 1824    enoxaparin (LOVENOX) subcutaneous injection 40 mg  40 mg Subcutaneous Daily Ana Ferguson MD   40 mg at 02/10/25 0918    hydroCHLOROthiazide tablet 12.5 mg  12.5 mg Oral Daily Ana Ferguson MD   12.5 mg at 02/10/25 0918    losartan (COZAAR) tablet 50 mg  50 mg Oral Daily Ana Ferguson MD   50 mg at 02/10/25 0918    metroNIDAZOLE (FLAGYL) IVPB (premix) 500 mg 100 mL  500 mg Intravenous Q8H Ana Ferguson  mL/hr at 02/10/25 1155 500 mg at 02/10/25 1155    morphine injection 4 mg  4 mg Intravenous Q4H PRN Ana Ferguson MD        multi-electrolyte (PLASMALYTE-A/ISOLYTE-S PH 7.4) IV solution  100 mL/hr Intravenous Continuous Ana Ferguson  mL/hr at 02/10/25 1430 100 mL/hr at 02/10/25 1430    ondansetron (ZOFRAN) injection 4 mg  4 mg Intravenous Q6H PRN Ana Ferguson MD        pantoprazole (PROTONIX) EC tablet 40 mg  40 mg Oral Daily Before Breakfast Ana V. Jegede, MD   40 mg at 02/10/25 0647          Objective:    Vitals:    02/09/25 2100 02/09/25 2120 02/10/25 0858 02/10/25 1437   BP: 132/71 137/62 140/64 125/59   BP Location: Left arm Left arm Right arm Right arm   Pulse: 86 84 69 63  "  Resp: 18 18 18 17   Temp: 98.1 °F (36.7 °C) 99.2 °F (37.3 °C) 98.4 °F (36.9 °C) 99.3 °F (37.4 °C)   TempSrc: Temporal Tympanic Temporal Temporal   SpO2: 95% 97% 97% 98%   Weight:  86.9 kg (191 lb 9.6 oz)     Height:  5' 5\" (1.651 m)          Physical Exam      No results found for: \"BNP\"   Lab Results   Component Value Date    WBC 8.80 02/10/2025    HGB 12.6 02/10/2025    HCT 36.4 02/10/2025    MCV 91 02/10/2025     02/10/2025     Lab Results   Component Value Date    INR 0.91 08/10/2022    INR 0.94 05/13/2015    PROTIME 12.5 08/10/2022    PROTIME 12.3 05/13/2015     Lab Results   Component Value Date    PTT 26 08/10/2022         I have personally reviewed pertinent imaging and laboratory results.     Code Status: Level 1 - Full Code  Advance Directive and Living Will:      Power of :    POLST:      IR has been consulted to evaluate the patient, determine the appropriate procedure, and whether or not a procedure can and should be performed.    Thank you for allowing me to participate in the care of Miley Locke. Please don't hesitate to call, text, email, or TigerText with any questions.     This text is generated with voice recognition software. There may be translation, syntax,  or grammatical errors. If you have any questions, please contact the dictating provider.        "

## 2025-02-10 NOTE — PLAN OF CARE
Problem: NEUROSENSORY - ADULT  Goal: Achieves stable or improved neurological status  Description: INTERVENTIONS  - Monitor and report changes in neurological status  - Monitor vital signs such as temperature, blood pressure, glucose, and any other labs ordered   - Initiate measures to prevent increased intracranial pressure  - Monitor for seizure activity and implement precautions if appropriate      Outcome: Progressing  Goal: Achieves maximal functionality and self care  Description: INTERVENTIONS  - Monitor swallowing and airway patency with patient fatigue and changes in neurological status  - Encourage and assist patient to increase activity and self care.   - Encourage visually impaired, hearing impaired and aphasic patients to use assistive/communication devices  Outcome: Progressing     Problem: CARDIOVASCULAR - ADULT  Goal: Maintains optimal cardiac output and hemodynamic stability  Description: INTERVENTIONS:  - Monitor I/O, vital signs and rhythm  - Monitor for S/S and trends of decreased cardiac output  - Administer and titrate ordered vasoactive medications to optimize hemodynamic stability  - Assess quality of pulses, skin color and temperature  - Assess for signs of decreased coronary artery perfusion  - Instruct patient to report change in severity of symptoms  Outcome: Progressing  Goal: Absence of cardiac dysrhythmias or at baseline rhythm  Description: INTERVENTIONS:  - Continuous cardiac monitoring, vital signs, obtain 12 lead EKG if ordered  - Administer antiarrhythmic and heart rate control medications as ordered  - Monitor electrolytes and administer replacement therapy as ordered  Outcome: Progressing     Problem: RESPIRATORY - ADULT  Goal: Achieves optimal ventilation and oxygenation  Description: INTERVENTIONS:  - Assess for changes in respiratory status  - Assess for changes in mentation and behavior  - Position to facilitate oxygenation and minimize respiratory effort  - Oxygen  administered by appropriate delivery if ordered  - Initiate smoking cessation education as indicated  - Encourage broncho-pulmonary hygiene including cough, deep breathe, Incentive Spirometry  - Assess the need for suctioning and aspirate as needed  - Assess and instruct to report SOB or any respiratory difficulty  - Respiratory Therapy support as indicated  Outcome: Progressing     Problem: GASTROINTESTINAL - ADULT  Goal: Minimal or absence of nausea and/or vomiting  Description: INTERVENTIONS:  - Administer IV fluids if ordered to ensure adequate hydration  - Maintain NPO status until nausea and vomiting are resolved  - Nasogastric tube if ordered  - Administer ordered antiemetic medications as needed  - Provide nonpharmacologic comfort measures as appropriate  - Advance diet as tolerated, if ordered  - Consider nutrition services referral to assist patient with adequate nutrition and appropriate food choices  Outcome: Progressing  Goal: Maintains or returns to baseline bowel function  Description: INTERVENTIONS:  - Assess bowel function  - Encourage oral fluids to ensure adequate hydration  - Administer IV fluids if ordered to ensure adequate hydration  - Administer ordered medications as needed  - Encourage mobilization and activity  - Consider nutritional services referral to assist patient with adequate nutrition and appropriate food choices  Outcome: Progressing  Goal: Maintains adequate nutritional intake  Description: INTERVENTIONS:  - Monitor percentage of each meal consumed  - Identify factors contributing to decreased intake, treat as appropriate  - Assist with meals as needed  - Monitor I&O, weight, and lab values if indicated  - Obtain nutrition services referral as needed  Outcome: Progressing  Goal: Oral mucous membranes remain intact  Description: INTERVENTIONS  - Assess oral mucosa and hygiene practices  - Implement preventative oral hygiene regimen  - Implement oral medicated treatments as  ordered  - Initiate Nutrition services referral as needed  Outcome: Progressing     Problem: GENITOURINARY - ADULT  Goal: Maintains or returns to baseline urinary function  Description: INTERVENTIONS:  - Assess urinary function  - Encourage oral fluids to ensure adequate hydration if ordered  - Administer IV fluids as ordered to ensure adequate hydration  - Administer ordered medications as needed  - Offer frequent toileting  - Follow urinary retention protocol if ordered  Outcome: Progressing  Goal: Absence of urinary retention  Description: INTERVENTIONS:  - Assess patient’s ability to void and empty bladder  - Monitor I/O  - Bladder scan as needed  - Discuss with physician/AP medications to alleviate retention as needed  - Discuss catheterization for long term situations as appropriate  Outcome: Progressing     Problem: METABOLIC, FLUID AND ELECTROLYTES - ADULT  Goal: Electrolytes maintained within normal limits  Description: INTERVENTIONS:  - Monitor labs and assess patient for signs and symptoms of electrolyte imbalances  - Administer electrolyte replacement as ordered  - Monitor response to electrolyte replacements, including repeat lab results as appropriate  - Instruct patient on fluid and nutrition as appropriate  Outcome: Progressing  Goal: Fluid balance maintained  Description: INTERVENTIONS:  - Monitor labs   - Monitor I/O and WT  - Instruct patient on fluid and nutrition as appropriate  - Assess for signs & symptoms of volume excess or deficit  Outcome: Progressing  Goal: Glucose maintained within target range  Description: INTERVENTIONS:  - Monitor Blood Glucose as ordered  - Assess for signs and symptoms of hyperglycemia and hypoglycemia  - Administer ordered medications to maintain glucose within target range  - Assess nutritional intake and initiate nutrition service referral as needed  Outcome: Progressing     Problem: SKIN/TISSUE INTEGRITY - ADULT  Goal: Skin Integrity remains intact(Skin  Breakdown Prevention)  Description: Assess:  -Perform Drake assessment every   -Clean and moisturize skin every   -Inspect skin when repositioning, toileting, and assisting with ADLS  -Assess under medical devices such as  every   -Assess extremities for adequate circulation and sensation     Bed Management:  -Have minimal linens on bed & keep smooth, unwrinkled  -Change linens as needed when moist or perspiring  -Avoid sitting or lying in one position for more than  hours while in bed  -Keep HOB at degrees     Toileting:  -Offer bedside commode  -Assess for incontinence every   -Use incontinent care products after each incontinent episode such as     Activity:  -Mobilize patient  times a day  -Encourage activity and walks on unit  -Encourage or provide ROM exercises   -Turn and reposition patient every  Hours  -Use appropriate equipment to lift or move patient in bed  -Instruct/ Assist with weight shifting every  when out of bed in chair  -Consider limitation of chair time  hour intervals    Skin Care:  -Avoid use of baby powder, tape, friction and shearing, hot water or constrictive clothing  -Relieve pressure over bony prominences using   -Do not massage red bony areas    Next Steps:  -Teach patient strategies to minimize risks such as    -Consider consults to  interdisciplinary teams such as   Outcome: Progressing     Problem: HEMATOLOGIC - ADULT  Goal: Maintains hematologic stability  Description: INTERVENTIONS  - Assess for signs and symptoms of bleeding or hemorrhage  - Monitor labs  - Administer supportive blood products/factors as ordered and appropriate  Outcome: Progressing     Problem: MUSCULOSKELETAL - ADULT  Goal: Maintain or return mobility to safest level of function  Description: INTERVENTIONS:  - Assess patient's ability to carry out ADLs; assess patient's baseline for ADL function and identify physical deficits which impact ability to perform ADLs (bathing, care of mouth/teeth, toileting,  grooming, dressing, etc.)  - Assess/evaluate cause of self-care deficits   - Assess range of motion  - Assess patient's mobility  - Assess patient's need for assistive devices and provide as appropriate  - Encourage maximum independence but intervene and supervise when necessary  - Involve family in performance of ADLs  - Assess for home care needs following discharge   - Consider OT consult to assist with ADL evaluation and planning for discharge  - Provide patient education as appropriate  Outcome: Progressing

## 2025-02-10 NOTE — PROGRESS NOTES
Progress Note - Hospitalist   Name: Miley Locke 69 y.o. female I MRN: [id number]  Unit/Bed#:  I Date of Admission: 2/9/2025   Date of Service: 2/10/2025 I Hospital Day: 0    Assessment & Plan  Diverticulitis  Pt presenting with constipation and gas pain found to have the findings below on CT  Distal sigmoid diverticulitis complicated by 3 cm abscess.   Note that the abscess projects inferiorly from the sigmoid colon and is inseparable from the vaginal cuff status post hysterectomy.  Correlate clinically for any vaginal discharge to suggest a fistula.   Fluid contents within the cecum and distal small bowel, consistent with a diarrheal GI illness versus mild enterocolitis.  Possibly reactive to the above findings.    PT seen with diverticulitis and 3 cm abscess  Will continue with IV abx with Rocephin and flagyl  Pt has been afebrile though admits she had an episode of fever to 101 about 3 days ago  No leukocytosis  General surgery determined no need for surgical intervention at this time. Will wait to see if IR will be able to drain abscess tomorrow.    Hopefully can clear with IV abx   ID consult  Start clear liquid diet today, gradually advance up to regular diet as tolerated.    Analgesia for pain control  IV fluids for hydration  GERD (gastroesophageal reflux disease)  Continue with protonix 40 mg daily  Prediabetes  Monitor FS   Dyslipidemia  Continue with home dose of atorvastatin  Constipation  Pt with known history of SBO, last BM was last night. No stool burden on CT  Abnormal CT of the abdomen  CT also noted with findings below  4 mm hypodense lesion in the pancreatic body.  For simple cyst(s) less than 1.5 cm, recommend follow-up every 2 years for 5 times or to age 80, whichever comes first.  Follow-up can stop at age 80 or can switch over to 80 year or older algorithm.  Recommend next follow-up in 2 years.  Preferred imaging modality: abdomen MRI and MRCP with and without IV contrast, or  "triple phase abdomen CT with IV contrast, or abdomen MRI and MRCP without IV contrast.  Hypertension  Resume home meds, pt on losartan and HCTZ  Hypokalemia  Replete as indicated  Colonic diverticular abscess  See \"diverticulitis\"     VTE Pharmacologic Prophylaxis:   Moderate Risk (Score 3-4) - Pharmacological DVT Prophylaxis Ordered: enoxaparin (Lovenox).    Mobility:   Basic Mobility Inpatient Raw Score: 24  JH-HLM Goal: 8: Walk 250 feet or more  JH-HLM Achieved: 6: Walk 10 steps or more  JH-HLM Goal NOT achieved. Continue with multidisciplinary rounding and encourage appropriate mobility to improve upon JH-HLM goals.    Patient Centered Rounds: I performed bedside rounds with nursing staff today.   Discussions with Specialists or Other Care Team Provider: CM, surgery    Education and Discussions with Family / Patient: Patient declined call to .     Current Length of Stay: 0 day(s)  Current Patient Status: Inpatient   Certification Statement: The patient will continue to require additional inpatient hospital stay due to IV abx  Discharge Plan: Anticipate discharge in 48 hrs to home.    Code Status: Level 1 - Full Code    Subjective   Patient is awake, sitting in bed, and not in any apparent distress. She states she feels as if the pain has improved somewhat today. Patient describes the abdominal pain as localized to the LLQ and says it \"comes and goes\". She denies nausea, vomiting, and diarrhea, but admits to still feeling constipated. She has noticed early satiety when eating, thus her daily intake has been decreased. Patient denies chest pain, shortness of breath, fatigue, and weakness. Patient overall is feeling better today and has no concerns at this time.     Objective :  Temp:  [98 °F (36.7 °C)-99.3 °F (37.4 °C)] 99.3 °F (37.4 °C)  HR:  [] 63  BP: (109-140)/(59-71) 125/59  Resp:  [16-18] 17  SpO2:  [94 %-98 %] 98 %  O2 Device: None (Room air)    Body mass index is 31.88 kg/m².     Input " and Output Summary (last 24 hours):     Intake/Output Summary (Last 24 hours) at 2/10/2025 1627  Last data filed at 2/10/2025 1243  Gross per 24 hour   Intake 1790.67 ml   Output 350 ml   Net 1440.67 ml       Physical Exam  Vitals and nursing note reviewed.   Constitutional:       General: She is not in acute distress.     Appearance: Normal appearance.   HENT:      Head: Normocephalic and atraumatic.   Cardiovascular:      Rate and Rhythm: Normal rate and regular rhythm.      Heart sounds: No murmur heard.     No gallop.   Pulmonary:      Effort: Pulmonary effort is normal. No respiratory distress.      Breath sounds: Normal breath sounds. No wheezing, rhonchi or rales.   Abdominal:      General: Bowel sounds are normal. There is no distension.      Palpations: Abdomen is soft.      Tenderness: There is no abdominal tenderness. There is no guarding or rebound.   Musculoskeletal:      Cervical back: Neck supple.      Right lower leg: No edema.      Left lower leg: No edema.   Skin:     General: Skin is warm and dry.   Neurological:      General: No focal deficit present.      Mental Status: She is alert and oriented to person, place, and time.   Psychiatric:         Mood and Affect: Mood normal.         Behavior: Behavior normal.           Lines/Drains:              Lab Results: I have reviewed the following results:   Results from last 7 days   Lab Units 02/10/25  0647   WBC Thousand/uL 8.80   HEMOGLOBIN g/dL 12.6   HEMATOCRIT % 36.4   PLATELETS Thousands/uL 236   SEGS PCT % 74   LYMPHO PCT % 14   MONO PCT % 10   EOS PCT % 1     Results from last 7 days   Lab Units 02/10/25  0647   SODIUM mmol/L 140   POTASSIUM mmol/L 3.7   CHLORIDE mmol/L 104   CO2 mmol/L 26   BUN mg/dL 11   CREATININE mg/dL 0.97   ANION GAP mmol/L 10   CALCIUM mg/dL 8.9   ALBUMIN g/dL 3.7   TOTAL BILIRUBIN mg/dL 0.93   ALK PHOS U/L 63   ALT U/L 7   AST U/L 10*   GLUCOSE RANDOM mg/dL 92                 Results from last 7 days   Lab Units  02/09/25  1730   LACTIC ACID mmol/L 1.5       Recent Cultures (last 7 days):   Results from last 7 days   Lab Units 02/09/25  1929   BLOOD CULTURE  Received in Microbiology Lab. Culture in Progress.  Received in Microbiology Lab. Culture in Progress.       Imaging Results Review: I reviewed radiology reports from this admission including: CT abdomen/pelvis.  Other Study Results Review: No additional pertinent studies reviewed.    Last 24 Hours Medication List:     Current Facility-Administered Medications:     acetaminophen (TYLENOL) tablet 650 mg, Q6H PRN    atorvastatin (LIPITOR) tablet 20 mg, Daily With Dinner    cefTRIAXone (ROCEPHIN) IVPB (premix in dextrose) 2,000 mg 50 mL, Q24H    Cholecalciferol (VITAMIN D3) tablet 2,000 Units, Daily    docusate sodium (COLACE) capsule 100 mg, BID    enoxaparin (LOVENOX) subcutaneous injection 40 mg, Daily    hydroCHLOROthiazide tablet 12.5 mg, Daily    losartan (COZAAR) tablet 50 mg, Daily    metroNIDAZOLE (FLAGYL) IVPB (premix) 500 mg 100 mL, Q8H, Last Rate: 500 mg (02/10/25 1155)    morphine injection 4 mg, Q4H PRN    multi-electrolyte (PLASMALYTE-A/ISOLYTE-S PH 7.4) IV solution, Continuous, Last Rate: 100 mL/hr (02/10/25 1430)    ondansetron (ZOFRAN) injection 4 mg, Q6H PRN    pantoprazole (PROTONIX) EC tablet 40 mg, Daily Before Breakfast    Administrative Statements   Today, Patient Was Seen By: Lorelei Benson PA-C,  I have spent a total time of 45 minutes in caring for this patient on the day of the visit/encounter including Diagnostic results, Prognosis, Risks and benefits of tx options, Instructions for management, Patient and family education, Importance of tx compliance, Risk factor reductions, Impressions, Counseling / Coordination of care, Documenting in the medical record, Reviewing / ordering tests, medicine, procedures  , Obtaining or reviewing history  , and Communicating with other healthcare professionals .    **Please Note: This note may have been  constructed using a voice recognition system.**

## 2025-02-11 LAB
ANION GAP SERPL CALCULATED.3IONS-SCNC: 7 MMOL/L (ref 4–13)
BASOPHILS # BLD AUTO: 0.03 THOUSANDS/ÂΜL (ref 0–0.1)
BASOPHILS NFR BLD AUTO: 0 % (ref 0–1)
BUN SERPL-MCNC: 9 MG/DL (ref 5–25)
CALCIUM SERPL-MCNC: 8.9 MG/DL (ref 8.4–10.2)
CHLORIDE SERPL-SCNC: 102 MMOL/L (ref 96–108)
CO2 SERPL-SCNC: 30 MMOL/L (ref 21–32)
CREAT SERPL-MCNC: 0.98 MG/DL (ref 0.6–1.3)
EOSINOPHIL # BLD AUTO: 0.07 THOUSAND/ÂΜL (ref 0–0.61)
EOSINOPHIL NFR BLD AUTO: 1 % (ref 0–6)
ERYTHROCYTE [DISTWIDTH] IN BLOOD BY AUTOMATED COUNT: 12.6 % (ref 11.6–15.1)
GFR SERPL CREATININE-BSD FRML MDRD: 58 ML/MIN/1.73SQ M
GLUCOSE SERPL-MCNC: 115 MG/DL (ref 65–140)
HCT VFR BLD AUTO: 36.4 % (ref 34.8–46.1)
HGB BLD-MCNC: 12.4 G/DL (ref 11.5–15.4)
IMM GRANULOCYTES # BLD AUTO: 0.03 THOUSAND/UL (ref 0–0.2)
IMM GRANULOCYTES NFR BLD AUTO: 0 % (ref 0–2)
LYMPHOCYTES # BLD AUTO: 0.99 THOUSANDS/ÂΜL (ref 0.6–4.47)
LYMPHOCYTES NFR BLD AUTO: 12 % (ref 14–44)
MAGNESIUM SERPL-MCNC: 2.1 MG/DL (ref 1.9–2.7)
MCH RBC QN AUTO: 31.1 PG (ref 26.8–34.3)
MCHC RBC AUTO-ENTMCNC: 34.1 G/DL (ref 31.4–37.4)
MCV RBC AUTO: 91 FL (ref 82–98)
MONOCYTES # BLD AUTO: 0.82 THOUSAND/ÂΜL (ref 0.17–1.22)
MONOCYTES NFR BLD AUTO: 10 % (ref 4–12)
NEUTROPHILS # BLD AUTO: 6.23 THOUSANDS/ÂΜL (ref 1.85–7.62)
NEUTS SEG NFR BLD AUTO: 77 % (ref 43–75)
NRBC BLD AUTO-RTO: 0 /100 WBCS
PLATELET # BLD AUTO: 230 THOUSANDS/UL (ref 149–390)
PMV BLD AUTO: 9.3 FL (ref 8.9–12.7)
POTASSIUM SERPL-SCNC: 3.7 MMOL/L (ref 3.5–5.3)
RBC # BLD AUTO: 3.99 MILLION/UL (ref 3.81–5.12)
SODIUM SERPL-SCNC: 139 MMOL/L (ref 135–147)
WBC # BLD AUTO: 8.17 THOUSAND/UL (ref 4.31–10.16)

## 2025-02-11 PROCEDURE — 85025 COMPLETE CBC W/AUTO DIFF WBC: CPT

## 2025-02-11 PROCEDURE — 83735 ASSAY OF MAGNESIUM: CPT

## 2025-02-11 PROCEDURE — G0545 PR INHERENT VISIT TO INPT: HCPCS | Performed by: INTERNAL MEDICINE

## 2025-02-11 PROCEDURE — 99233 SBSQ HOSP IP/OBS HIGH 50: CPT | Performed by: SURGERY

## 2025-02-11 PROCEDURE — 99232 SBSQ HOSP IP/OBS MODERATE 35: CPT

## 2025-02-11 PROCEDURE — 99231 SBSQ HOSP IP/OBS SF/LOW 25: CPT | Performed by: RADIOLOGY

## 2025-02-11 PROCEDURE — 99232 SBSQ HOSP IP/OBS MODERATE 35: CPT | Performed by: INTERNAL MEDICINE

## 2025-02-11 PROCEDURE — 80048 BASIC METABOLIC PNL TOTAL CA: CPT

## 2025-02-11 RX ORDER — METRONIDAZOLE 500 MG/1
500 TABLET ORAL EVERY 8 HOURS SCHEDULED
Status: DISCONTINUED | OUTPATIENT
Start: 2025-02-11 | End: 2025-02-13 | Stop reason: HOSPADM

## 2025-02-11 RX ADMIN — ACETAMINOPHEN 650 MG: 325 TABLET, FILM COATED ORAL at 07:30

## 2025-02-11 RX ADMIN — DOCUSATE SODIUM 100 MG: 100 CAPSULE, LIQUID FILLED ORAL at 12:43

## 2025-02-11 RX ADMIN — ATORVASTATIN CALCIUM 20 MG: 10 TABLET, FILM COATED ORAL at 16:53

## 2025-02-11 RX ADMIN — METRONIDAZOLE 500 MG: 500 TABLET ORAL at 21:20

## 2025-02-11 RX ADMIN — CEFTRIAXONE 2000 MG: 2 INJECTION, SOLUTION INTRAVENOUS at 19:50

## 2025-02-11 RX ADMIN — PANTOPRAZOLE SODIUM 40 MG: 40 TABLET, DELAYED RELEASE ORAL at 07:30

## 2025-02-11 RX ADMIN — LOSARTAN POTASSIUM 50 MG: 50 TABLET, FILM COATED ORAL at 12:43

## 2025-02-11 RX ADMIN — METRONIDAZOLE 500 MG: 500 TABLET ORAL at 12:43

## 2025-02-11 RX ADMIN — METRONIDAZOLE 500 MG: 500 INJECTION, SOLUTION INTRAVENOUS at 05:00

## 2025-02-11 RX ADMIN — Medication 2000 UNITS: at 12:43

## 2025-02-11 RX ADMIN — HYDROCHLOROTHIAZIDE 12.5 MG: 12.5 TABLET ORAL at 12:43

## 2025-02-11 RX ADMIN — DOCUSATE SODIUM 100 MG: 100 CAPSULE, LIQUID FILLED ORAL at 17:27

## 2025-02-11 NOTE — ASSESSMENT & PLAN NOTE
Clinical management and recommendations as noted above.  Patient for possible IR drainage later today.  Patient remains n.p.o. pending disposition from IR evaluation.

## 2025-02-11 NOTE — ASSESSMENT & PLAN NOTE
Patient with sigmoid diverticulitis and abscess.    ID consult appreciated, patient remains on IV ceftriaxone and metronidazole.  ID recommended transition to p.o. Augmentin when further clinically improved.    CTAP +distal sigmoid diverticulitis complicated by 3 cm abscess which appears to project inferiorly from the sigmoid colon and is inseparable from the vaginal cuff status post hysterectomy.  Possible colovaginal fistula.  Patient denies any vaginal discharge.    Vital signs reviewed, afebrile.    Patient comfortable, had loose watery bowel movement yesterday.  Currently NPO.  She denies any vaginal discharge.    Labs showed normal white count 8.17.  Hemoglobin 12.4.  BMP normal electrolytes.  Mag 2.1.    Discussed with Chinmay Tay from interventional radiology this morning, he will discuss treatment options with the patient if possible transvaginal drainage of pelvic abscess would be amenable.    General Surgery continue to follow, no plans for surgical intervention at this time.    History of illness as follows:    69-year-old female with previous history of total abdominal hysterectomy in the past was seen at urgent care in [place] 6 days ago because of abdominal cramping, decreased appetite and constipation.  She was discharged from urgent care and told to take Gas-X 4 times a day which did not improve her abdominal cramping and bloating.  She presented here to the ED on Saturday, 2 days ago.  Denied any fever or chills.  No previous history of similar symptoms.  Last colonoscopy was 5 years ago.  She was not told in the past that she had diverticulosis.  She was hospitalized here 10 or 12 years ago with small bowel obstruction resolved with conservative nonoperative management, NG tube decompression.    Patient admitted to medicine service with sigmoid diverticulitis, CT results below.  Noted abscess formation.    She denies any vaginal discharge.  No fever or chills.    PLAN:    Keep n.p.o.  Await  disposition evaluation from IR regarding possible transvaginal drainage of pelvic abscess  Continue present IV ceftriaxone and metronidazole per ID recommendation and eventual transition to p.o. Augmentin to complete antibiotic course of duration.  Repeat a.m. labs including CBC, BMP  Monitor and replete electrolytes as needed  Serial abdominal exams  Colace twice daily  Discussed and updated in person with Dr. Clint Mendoza, he will examine the patient later today.  Management discussed in person this morning with Dr. Chinmay Tay from IR, he will examine patient discussed treatment options with her today.      Eventual outpatient colonoscopy by GI in about 6 weeks after current bout of diverticulitis has resolved and she completed antibiotic treatment.

## 2025-02-11 NOTE — ASSESSMENT & PLAN NOTE
Pt presenting with constipation and gas pain found to have the findings below on CT  Distal sigmoid diverticulitis complicated by 3 cm abscess.   Note that the abscess projects inferiorly from the sigmoid colon and is inseparable from the vaginal cuff status post hysterectomy.  Correlate clinically for any vaginal discharge to suggest a fistula.   Fluid contents within the cecum and distal small bowel, consistent with a diarrheal GI illness versus mild enterocolitis.  Possibly reactive to the above findings.    Patient seen with diverticulitis and 3 cm abscess   Remains afebrile wo leukocytosis    General surgery determined no need for surgical intervention at this time, recommending IR drainage. IR to evaluate and determine if this campus has the capabilities.   Continue with IV abx with Rocephin and Flagyl   ID consult appreciated.   NPO for now. If no procedure today, can c/w CLD  Analgesia for pain control.   IV fluids for hydration.   Blood cultures negative at 24hrs

## 2025-02-11 NOTE — TREATMENT PLAN
Secure chat message discussion regarding patient management conducted with Dr. Chinmay Tay and Sri Caro PA-C from medicine service.    Dr. Tay is recommending tertiary transfer to a facility with capability to perform transvaginal drain placement of 3 cm pelvic abscess, necessary equipment for this invasive procedure not available here at this [facility].  Dr. Tay feels that the patient would be better off for transfer, possibly boomerang return back to this hospital, who can perform transvaginal drainage at a central location.    Patient has been n.p.o. since midnight today.  May feed the patient today, patient placed on full liquid toast and crackers and then n.p.o. after midnight tomorrow.    Hold subcu Lovenox.    Remainder of medical management and tertiary transfer to IR capable facility by medicine service, general surgery will continue to follow if the patient were to return back after procedural intervention.    Tariq Rodriguez PA-C

## 2025-02-11 NOTE — CASE MANAGEMENT
Case Management Progress Note    Patient name Miley Locke  Location / MRN [id number]  : 1955 Date 2025       LOS (days): 1  Geometric Mean LOS (GMLOS) (days): 2.5  Days to GMLOS:1.2        OBJECTIVE:        Current admission status: Inpatient  Preferred Pharmacy:   [id number] - [address] [address]  Phone: [phone] Fax: [phone]    Primary Care Provider: No primary care provider on file.    Primary Insurance: MEDICARE  Secondary Insurance: BLUE CROSS    PROGRESS NOTE:provider placed boomerang order to go to  tomorrow at [facility].

## 2025-02-11 NOTE — ASSESSMENT & PLAN NOTE
Patient presented with abdominal pain, with CT showing sigmoid diverticulitis and a peridiverticular abscess.  Patient notes already clinically much improved on antibiotics and remains systemically well.  Surgery evaluated patient and felt that surgery is not a necessary.  Although patient is clinically improved on antibiotics and her abscess will likely resolve with antibiotics alone, patient would need to stay on antibiotic for an extended period of time until the abscess is resolved completely on repeat imaging, which may take 2 to 3 months.  If her abscess is able to be drained percutaneously, she would only need to be antibiotic for brief course afterwards.  IR preliminary evaluation noted.  No good window for percutaneous drainage, although possible transvaginal drainage.  Continue ceftriaxone/Flagyl for now.  Monitor temperature/WBC.  Monitor abdominal pain.  Transition to p.o. Augmentin when patient is further clinically improved.  IR to evaluate possible transvaginal drainage of pelvic abscess.  If IR is unable to drain abscess, patient will need to stay on antibiotic until abscess is resolved completely on repeat imaging.

## 2025-02-11 NOTE — PROGRESS NOTES
Progress Note - Infectious Disease   Name: Miley Locke 69 y.o. female I MRN: [id number]  Unit/Bed#:  I Date of Admission: 2/9/2025   Date of Service: 2/11/2025 I Hospital Day: 1      Administrative Statements   VIRTUAL CARE DOCUMENTATION:     1. This service was provided via Telemedicine using [email or web address] Kit     2. Parties in the room with patient during teleconsult Patient only    3. Confidentiality My office door was closed     4. Participants No one else was in the room    5. Patient acknowledged consent and understanding of privacy and security of the  Telemedicine consult. I informed the patient that I have reviewed their record in Epic and presented the opportunity for them to ask any questions regarding the visit today.  The patient agreed to participate.    6. I have spent a total time of 30 minutes in caring for this patient on the day of the visit/encounter including Diagnostic results, Impressions, Counseling / Coordination of care, Documenting in the medical record, Reviewing / ordering tests, medicine, procedures  , and Communicating with other healthcare professionals , not including the time spent for establishing the audio/video connection.      Assessment & Plan  Colonic diverticular abscess  Patient presented with abdominal pain, with CT showing sigmoid diverticulitis and a peridiverticular abscess.  Patient notes already clinically much improved on antibiotics and remains systemically well.  Surgery evaluated patient and felt that surgery is not a necessary.  Although patient is clinically improved on antibiotics and her abscess will likely resolve with antibiotics alone, patient would need to stay on antibiotic for an extended period of time until the abscess is resolved completely on repeat imaging, which may take 2 to 3 months.  If her abscess is able to be drained percutaneously, she would only need to be antibiotic for brief course afterwards.  IR preliminary evaluation noted.  No  good window for percutaneous drainage, although possible transvaginal drainage.  Continue ceftriaxone/Flagyl for now.  Monitor temperature/WBC.  Monitor abdominal pain.  Transition to p.o. Augmentin when patient is further clinically improved.  IR to evaluate possible transvaginal drainage of pelvic abscess.  If IR is unable to drain abscess, patient will need to stay on antibiotic until abscess is resolved completely on repeat imaging.  Diverticulitis  Patient has no history of diverticulitis.  By her report, she had colonoscopy 5 years ago and was told it was normal.  Patient will need repeat colonoscopy in a few weeks to assess for evidence of diverticulosis.  Antibiotic plan as in above.  Recommend repeat colonoscopy in a few weeks.  Abnormal CT of the abdomen  Patient also has a hypodense lesion noted on pancreas on CT.  Follow-up per PCP and primary service.    Discussed with patient in detail regarding the above plan.  I have discussed with Sri Caro PA-C from primary service regarding the above plan to continue IV antibiotic, pending IR assessment for abscess drainage.  She agrees with the plan.    Antibiotics:  Ceftriaxone/Flagyl # 3    Subjective   Patient feels better.  Abdominal pain and discomfort continues to improve.  Stool loose.  Temperature stays down.  No chills.    Objective :  Temp:  [97.8 °F (36.6 °C)-99.3 °F (37.4 °C)] 97.8 °F (36.6 °C)  HR:  [63-81] 81  BP: (123-140)/(59-64) 134/61  Resp:  [16-18] 18  SpO2:  [96 %-98 %] 97 %  O2 Device: None (Room air)    Physical exam has been primarily done by the patient's nurse and/or the primary service due to limited examination abilities on telemedicine.     General:  No acute distress  Psychiatric:  Awake and alert  Pulmonary:  Normal respiratory excursion without accessory muscle use  Abdomen:  Soft, nondistended, mild and improved lower abdominal tenderness, bowel sounds normal  Extremities:  No edema  Skin:  No rashes      Lab Results: I have  reviewed the following results:  Results from last 7 days   Lab Units 02/11/25  0643 02/10/25  0647 02/09/25  1730   WBC Thousand/uL 8.17 8.80 9.77   HEMOGLOBIN g/dL 12.4 12.6 13.9   PLATELETS Thousands/uL 230 236 270     Results from last 7 days   Lab Units 02/11/25  0643 02/10/25  0647 02/09/25  1730   SODIUM mmol/L 139 140 136   POTASSIUM mmol/L 3.7 3.7 3.0*   CHLORIDE mmol/L 102 104 96   CO2 mmol/L 30 26 28   BUN mg/dL 9 11 14   CREATININE mg/dL 0.98 0.97 1.13   EGFR ml/min/1.73sq m 58 58 49   CALCIUM mg/dL 8.9 8.9 9.6   AST U/L  --  10* 12*   ALT U/L  --  7 9   ALK PHOS U/L  --  63 68   ALBUMIN g/dL  --  3.7 4.2     Results from last 7 days   Lab Units 02/09/25  1929   BLOOD CULTURE  Received in Microbiology Lab. Culture in Progress.  Received in Microbiology Lab. Culture in Progress.

## 2025-02-11 NOTE — PROGRESS NOTES
Progress Note - Surgery-General   Name: Miley Locke 69 y.o. female I MRN: [id number]  Unit/Bed#:  I Date of Admission: 2/9/2025   Date of Service: 2/11/2025 I Hospital Day: 1     Assessment & Plan  Diverticulitis  Patient with sigmoid diverticulitis and abscess.    ID consult appreciated, patient remains on IV ceftriaxone and metronidazole.  ID recommended transition to p.o. Augmentin when further clinically improved.    CTAP +distal sigmoid diverticulitis complicated by 3 cm abscess which appears to project inferiorly from the sigmoid colon and is inseparable from the vaginal cuff status post hysterectomy.  Possible colovaginal fistula.  Patient denies any vaginal discharge.    Vital signs reviewed, afebrile.    Patient comfortable, had loose watery bowel movement yesterday.  Currently NPO.  She denies any vaginal discharge.    Labs showed normal white count 8.17.  Hemoglobin 12.4.  BMP normal electrolytes.  Mag 2.1.    Discussed with Chinmay Tay from interventional radiology this morning, he will discuss treatment options with the patient if possible transvaginal drainage of pelvic abscess would be amenable.    General Surgery continue to follow, no plans for surgical intervention at this time.    History of illness as follows:    69-year-old female with previous history of total abdominal hysterectomy in the past was seen at urgent care in [place] 6 days ago because of abdominal cramping, decreased appetite and constipation.  She was discharged from urgent care and told to take Gas-X 4 times a day which did not improve her abdominal cramping and bloating.  She presented here to the ED on Saturday, 2 days ago.  Denied any fever or chills.  No previous history of similar symptoms.  Last colonoscopy was 5 years ago.  She was not told in the past that she had diverticulosis.  She was hospitalized here 10 or 12 years ago with small bowel obstruction resolved with conservative nonoperative management, NG  tube decompression.    Patient admitted to medicine service with sigmoid diverticulitis, CT results below.  Noted abscess formation.    She denies any vaginal discharge.  No fever or chills.    PLAN:    Keep n.p.o.  Await disposition evaluation from IR regarding possible transvaginal drainage of pelvic abscess  Continue present IV ceftriaxone and metronidazole per ID recommendation and eventual transition to p.o. Augmentin to complete antibiotic course of duration.  Repeat a.m. labs including CBC, BMP  Monitor and replete electrolytes as needed  Serial abdominal exams  Colace twice daily  Discussed and updated in person with Dr. Clint Mendoza, he will examine the patient later today.  Management discussed in person this morning with Dr. Chinmay Tay from IR, he will examine patient discussed treatment options with her today.      Eventual outpatient colonoscopy by GI in about 6 weeks after current bout of diverticulitis has resolved and she completed antibiotic treatment.  GERD (gastroesophageal reflux disease)  Stable, remains on PPI.  Prediabetes  Noted, outpatient follow-up with PCP  Dyslipidemia  Management per the attending primary hospitalist service  Constipation  No bowel movement since admission.  Abnormal CT of the abdomen  As noted below.  Hypertension  Management per the attending primary service.  Hypokalemia  Initial K3.0, patient received electrolyte repletion.  Repeated K today normal 3.7  Colonic diverticular abscess  Clinical management and recommendations as noted above.  Patient for possible IR drainage later today.  Patient remains n.p.o. pending disposition from IR evaluation.    Surgery-General service will follow.    Subjective     Abdominal pain improved.  No fever.  Had watery bowel movement yesterday.  Currently NPO.  Interventional radiology to evaluate patient for possible IR drainage of diverticular abscess.    Scheduled Meds:  Current Facility-Administered Medications   Medication  Dose Route Frequency Provider Last Rate    acetaminophen  650 mg Oral Q6H PRN Ana Ferguson MD      atorvastatin  20 mg Oral Daily With Dinner Ana Ferguson MD      cefTRIAXone  2,000 mg Intravenous Q24H Ana Ferguson MD 2,000 mg (02/10/25 1824)    Cholecalciferol  2,000 Units Oral Daily Ana Ferguson MD      docusate sodium  100 mg Oral BID Tariq Rodriguez PA-C      enoxaparin  40 mg Subcutaneous Daily Ana Ferguson MD      hydroCHLOROthiazide  12.5 mg Oral Daily Ana Ferguson MD      losartan  50 mg Oral Daily Ana Ferguson MD      metroNIDAZOLE  500 mg Oral Q8H [facility] Fabrizio Velarde MD      morphine injection  4 mg Intravenous Q4H PRN Ana Ferguson MD      ondansetron  4 mg Intravenous Q6H PRN Ana Ferguson MD      pantoprazole  40 mg Oral Daily Before Breakfast Ana Ferguson MD       Continuous Infusions:   PRN Meds:.  acetaminophen    morphine injection    ondansetron      Objective :  Temp:  [97.8 °F (36.6 °C)-99.3 °F (37.4 °C)] 97.8 °F (36.6 °C)  HR:  [63-81] 81  BP: (123-134)/(59-61) 134/61  Resp:  [16-18] 18  SpO2:  [96 %-98 %] 97 %  O2 Device: None (Room air)    I/O         02/09 0701  02/10 0700 02/10 0701 02/11 0700 02/11 0701  02/12 0700    P.O. 0 1200 0    I.V. (mL/kg) 141.7 (1.6)      IV Piggyback 1049      Total Intake(mL/kg) 1190.7 (13.7) 1200 (13.8) 0 (0)    Urine (mL/kg/hr)  700 (0.3)     Total Output  700     Net +1190.7 +500 0           Unmeasured Urine Occurrence 2 x              Physical Exam  Vitals reviewed.   HENT:      Head: Normocephalic.      Nose: Nose normal.      Mouth/Throat:      Mouth: Mucous membranes are moist.   Cardiovascular:      Rate and Rhythm: Regular rhythm.      Heart sounds: Normal heart sounds. No murmur heard.  Pulmonary:      Breath sounds: Normal breath sounds. No wheezing or rales.   Abdominal:      General: Bowel sounds are normal. There is no distension.      Palpations: There is no mass.      Tenderness: There  "is abdominal tenderness. There is no right CVA tenderness, left CVA tenderness, guarding or rebound.      Hernia: No hernia is present.      Comments: Minimal tenderness left lower quadrant and suprapubic area without any guarding or rebound.  No masses are felt.   Musculoskeletal:         General: No swelling, tenderness, deformity or signs of injury.   Skin:     Capillary Refill: Capillary refill takes less than 2 seconds.      Coloration: Skin is not pale.      Findings: No erythema or rash.   Neurological:      General: No focal deficit present.      Mental Status: She is alert.      Motor: No weakness.   Psychiatric:         Mood and Affect: Mood normal.         Thought Content: Thought content normal.         Judgment: Judgment normal.         Lab Results: I have reviewed the following results:  Recent Labs     02/09/25  1730 02/10/25  0647 02/11/25  0643   WBC 9.77 8.80 8.17   HGB 13.9 12.6 12.4   HCT 40.5 36.4 36.4    236 230   SODIUM 136 140 139   K 3.0* 3.7 3.7   CL 96 104 102   CO2 28 26 30   BUN 14 11 9   CREATININE 1.13 0.97 0.98   GLUC 112 92 115   MG  --   --  2.1   AST 12* 10*  --    ALT 9 7  --    ALB 4.2 3.7  --    TBILI 1.23* 0.93  --    ALKPHOS 68 63  --    LACTICACID 1.5  --   --        Imaging Results Review: I reviewed radiology reports from this admission including: CT abdomen/pelvis.  Other Study Results Review: No additional pertinent studies reviewed.    VTE Pharmacologic Prophylaxis: Enoxaparin (Lovenox)  VTE Mechanical Prophylaxis: sequential compression device    Tariq Rodriguez PA-C    **Please Note: Portions of the record may have been created using voice recognition software.  Occasional wrong word or \"sound a like\" substitutions may have occurred due to the inherent limitations of voice recognition software.  Read the chart carefully and recognize, using context, where substitutions have occurred.**      "

## 2025-02-11 NOTE — PROGRESS NOTES
"Patient with diverticular abscess in the pelvis.  No good transabdominal approach.  The only percutaneous option would be Nunez sciatic.  This is actually a perfect case for transvaginal drainage.  I checked an ultrasound today.  We do have the software package to perform needle guidance with the transvaginal probe.  Unfortunately even not have the disposable needle guidance.    I went over the images with the patient and talked about the different approaches.  I offered CT guided transvaginal drainage.  I noted that this was a \"off label,\" use of CT guidance, and it may not line up correctly.  After discussion, she think she would like to opt for transvaginal drainage with a dedicated machine.  This is perfectly reasonable.      Transfer recommended.  "

## 2025-02-11 NOTE — CASE MANAGEMENT
Case Management Progress Note    Patient name Miley Locke  Location / MRN [id number]  : 1955 Date 2025       LOS (days): 1  Geometric Mean LOS (GMLOS) (days): 2.5  Days to GMLOS:1.2        OBJECTIVE:        Current admission status: Inpatient  [id number] [address] [facility] [address] [address]  Phone: [phone] Fax: [phone]    Primary Care Provider: No primary care provider on file.    Primary Insurance: MEDICARE  Secondary Insurance: BLUE CROSS    PROGRESS NOTE:pt is a 11am  tomorrow 25 by Lee CASTILLO to go to [facility] for IR procedure. Pt will then return at 1500. Nursing supervisor and primary nurse aware. Placed information on sticky note. Patient updated at bedside along with spouse on transport time.

## 2025-02-11 NOTE — PLAN OF CARE
Problem: NEUROSENSORY - ADULT  Goal: Achieves stable or improved neurological status  Description: INTERVENTIONS  - Monitor and report changes in neurological status  - Monitor vital signs such as temperature, blood pressure, glucose, and any other labs ordered   - Initiate measures to prevent increased intracranial pressure  - Monitor for seizure activity and implement precautions if appropriate      Outcome: Progressing  Goal: Achieves maximal functionality and self care  Description: INTERVENTIONS  - Monitor swallowing and airway patency with patient fatigue and changes in neurological status  - Encourage and assist patient to increase activity and self care.   - Encourage visually impaired, hearing impaired and aphasic patients to use assistive/communication devices  Outcome: Progressing     Problem: CARDIOVASCULAR - ADULT  Goal: Maintains optimal cardiac output and hemodynamic stability  Description: INTERVENTIONS:  - Monitor I/O, vital signs and rhythm  - Monitor for S/S and trends of decreased cardiac output  - Administer and titrate ordered vasoactive medications to optimize hemodynamic stability  - Assess quality of pulses, skin color and temperature  - Assess for signs of decreased coronary artery perfusion  - Instruct patient to report change in severity of symptoms  Outcome: Progressing  Goal: Absence of cardiac dysrhythmias or at baseline rhythm  Description: INTERVENTIONS:  - Continuous cardiac monitoring, vital signs, obtain 12 lead EKG if ordered  - Administer antiarrhythmic and heart rate control medications as ordered  - Monitor electrolytes and administer replacement therapy as ordered  Outcome: Progressing     Problem: RESPIRATORY - ADULT  Goal: Achieves optimal ventilation and oxygenation  Description: INTERVENTIONS:  - Assess for changes in respiratory status  - Assess for changes in mentation and behavior  - Position to facilitate oxygenation and minimize respiratory effort  - Oxygen  administered by appropriate delivery if ordered  - Initiate smoking cessation education as indicated  - Encourage broncho-pulmonary hygiene including cough, deep breathe, Incentive Spirometry  - Assess the need for suctioning and aspirate as needed  - Assess and instruct to report SOB or any respiratory difficulty  - Respiratory Therapy support as indicated  Outcome: Progressing     Problem: GASTROINTESTINAL - ADULT  Goal: Minimal or absence of nausea and/or vomiting  Description: INTERVENTIONS:  - Administer IV fluids if ordered to ensure adequate hydration  - Maintain NPO status until nausea and vomiting are resolved  - Nasogastric tube if ordered  - Administer ordered antiemetic medications as needed  - Provide nonpharmacologic comfort measures as appropriate  - Advance diet as tolerated, if ordered  - Consider nutrition services referral to assist patient with adequate nutrition and appropriate food choices  Outcome: Progressing  Goal: Maintains or returns to baseline bowel function  Description: INTERVENTIONS:  - Assess bowel function  - Encourage oral fluids to ensure adequate hydration  - Administer IV fluids if ordered to ensure adequate hydration  - Administer ordered medications as needed  - Encourage mobilization and activity  - Consider nutritional services referral to assist patient with adequate nutrition and appropriate food choices  Outcome: Progressing  Goal: Maintains adequate nutritional intake  Description: INTERVENTIONS:  - Monitor percentage of each meal consumed  - Identify factors contributing to decreased intake, treat as appropriate  - Assist with meals as needed  - Monitor I&O, weight, and lab values if indicated  - Obtain nutrition services referral as needed  Outcome: Progressing  Goal: Oral mucous membranes remain intact  Description: INTERVENTIONS  - Assess oral mucosa and hygiene practices  - Implement preventative oral hygiene regimen  - Implement oral medicated treatments as  ordered  - Initiate Nutrition services referral as needed  Outcome: Progressing     Problem: GENITOURINARY - ADULT  Goal: Maintains or returns to baseline urinary function  Description: INTERVENTIONS:  - Assess urinary function  - Encourage oral fluids to ensure adequate hydration if ordered  - Administer IV fluids as ordered to ensure adequate hydration  - Administer ordered medications as needed  - Offer frequent toileting  - Follow urinary retention protocol if ordered  Outcome: Progressing  Goal: Absence of urinary retention  Description: INTERVENTIONS:  - Assess patient’s ability to void and empty bladder  - Monitor I/O  - Bladder scan as needed  - Discuss with physician/AP medications to alleviate retention as needed  - Discuss catheterization for long term situations as appropriate  Outcome: Progressing     Problem: METABOLIC, FLUID AND ELECTROLYTES - ADULT  Goal: Electrolytes maintained within normal limits  Description: INTERVENTIONS:  - Monitor labs and assess patient for signs and symptoms of electrolyte imbalances  - Administer electrolyte replacement as ordered  - Monitor response to electrolyte replacements, including repeat lab results as appropriate  - Instruct patient on fluid and nutrition as appropriate  Outcome: Progressing  Goal: Fluid balance maintained  Description: INTERVENTIONS:  - Monitor labs   - Monitor I/O and WT  - Instruct patient on fluid and nutrition as appropriate  - Assess for signs & symptoms of volume excess or deficit  Outcome: Progressing  Goal: Glucose maintained within target range  Description: INTERVENTIONS:  - Monitor Blood Glucose as ordered  - Assess for signs and symptoms of hyperglycemia and hypoglycemia  - Administer ordered medications to maintain glucose within target range  - Assess nutritional intake and initiate nutrition service referral as needed  Outcome: Progressing     Problem: SKIN/TISSUE INTEGRITY - ADULT  Goal: Skin Integrity remains intact(Skin  Breakdown Prevention)  Description: Assess:  -Perform Drake assessment every shift  -Clean and moisturize skin every shift  -Inspect skin when repositioning, toileting, and assisting with ADLS  -Assess extremities for adequate circulation and sensation     Bed Management:  -Have minimal linens on bed & keep smooth, unwrinkled  -Change linens as needed when moist or perspiring  -Avoid sitting or lying in one position for more than 2 hours while in bed  -Keep HOB at 30 degrees     Toileting:  -Offer bedside commode  -Assess for incontinence every shift  -Use incontinent care products after each incontinent episode such as thi wipes    Activity:  -Mobilize patient 3 times a day  -Encourage activity and walks on unit  -Encourage or provide ROM exercises   -Turn and reposition patient every 2 Hours  -Use appropriate equipment to lift or move patient in bed  -Instruct/ Assist with weight shifting every hour when out of bed in chair  -Consider limitation of chair time 6 hour intervals    Skin Care:  -Avoid use of baby powder, tape, friction and shearing, hot water or constrictive clothing  -Relieve pressure over bony prominences using foam wedges/pillows  -Do not massage red bony areas      Outcome: Progressing     Problem: HEMATOLOGIC - ADULT  Goal: Maintains hematologic stability  Description: INTERVENTIONS  - Assess for signs and symptoms of bleeding or hemorrhage  - Monitor labs  - Administer supportive blood products/factors as ordered and appropriate  Outcome: Progressing     Problem: MUSCULOSKELETAL - ADULT  Goal: Maintain or return mobility to safest level of function  Description: INTERVENTIONS:  - Assess patient's ability to carry out ADLs; assess patient's baseline for ADL function and identify physical deficits which impact ability to perform ADLs (bathing, care of mouth/teeth, toileting, grooming, dressing, etc.)  - Assess/evaluate cause of self-care deficits   - Assess range of motion  - Assess patient's  mobility  - Assess patient's need for assistive devices and provide as appropriate  - Encourage maximum independence but intervene and supervise when necessary  - Involve family in performance of ADLs  - Assess for home care needs following discharge   - Consider OT consult to assist with ADL evaluation and planning for discharge  - Provide patient education as appropriate  Outcome: Progressing

## 2025-02-11 NOTE — PROGRESS NOTES
Progress Note - Hospitalist   Name: Miley Locke 69 y.o. female I MRN: [id number]  Unit/Bed#:  I Date of Admission: 2/9/2025   Date of Service: 2/11/2025 I Hospital Day: 1    Assessment & Plan  Diverticulitis  Pt presenting with constipation and gas pain found to have the findings below on CT  Distal sigmoid diverticulitis complicated by 3 cm abscess.   Note that the abscess projects inferiorly from the sigmoid colon and is inseparable from the vaginal cuff status post hysterectomy.  Correlate clinically for any vaginal discharge to suggest a fistula.   Fluid contents within the cecum and distal small bowel, consistent with a diarrheal GI illness versus mild enterocolitis.  Possibly reactive to the above findings.    Patient seen with diverticulitis and 3 cm abscess   Remains afebrile wo leukocytosis    General surgery determined no need for surgical intervention at this time, recommending IR drainage. IR to evaluate and determine if this [facility] has the capabilities.   Continue with IV abx with Rocephin and Flagyl   ID consult appreciated.   NPO for now. If no procedure today, can c/w CLD  Analgesia for pain control.   IV fluids for hydration.   Blood cultures negative at 24hrs    GERD (gastroesophageal reflux disease)  Continue with protonix 40 mg daily  Prediabetes  Monitor FS   Dyslipidemia  Continue with home dose of atorvastatin  Constipation  Last BM 2/10  Abnormal CT of the abdomen  CT with 4 mm hypodense lesion in the pancreatic body. For simple cyst(s) less than 1.5 cm, recommend follow-up every 2 years for 5 times or to age 80, whichever comes first. Follow-up can stop at age 80 or can switch over to 80 year or older algorithm. Recommend next follow-up in 2 years.  Preferred imaging modality: abdomen MRI and MRCP with and without IV contrast, or triple phase abdomen CT with IV contrast, or abdomen MRI and MRCP without IV contrast.  Hypertension  Continue losartan and HCTZ  Hypokalemia  Replete as  "indicated  Colonic diverticular abscess  See \"diverticulitis\"     VTE Pharmacologic Prophylaxis:   lovenox    Mobility:   Basic Mobility Inpatient Raw Score: 24  JH-HLM Goal: 8: Walk 250 feet or more  JH-HLM Achieved: 7: Walk 25 feet or more  JH-HLM Goal NOT achieved. Continue with multidisciplinary rounding and encourage appropriate mobility to improve upon JH-HLM goals.    Patient Centered Rounds: I performed bedside rounds with nursing staff today.   Discussions with Specialists or Other Care Team Provider: case management, gen surg, IR    Education and Discussions with Family / Patient: Updated  () at bedside.    Current Length of Stay: 1 day(s)  Current Patient Status: Inpatient   Certification Statement: The patient will continue to require additional inpatient hospital stay due to acute diverticulitis with abscess requiring drainage  Discharge Plan: Anticipate discharge in 48 hrs to home.    Code Status: Level 1 - Full Code    Subjective   Patient is awake, alert, and in no acute distress. Patient was concerned about procedure after virtual visit with ID, as she was not expecting to possibly get transferred to another campus. She describes the abdominal pain as more of a \"discomfort\" that is increased with passing gas. Patient stated she had a \"small amount of diarrhea\" last night, which she found to be reassuring due to concern for constipation. Patient denies nausea, vomiting, chest pain, shortness of breath, weakness, fatigue, and recent fever or chills. Her appetite is still not great, but she tolerated the liquid diet well yesterday. Patient has no further concerns at this time besides some nervousness regarding the procedure. Reassured patient that IR would be in to discuss the procedure with her and answer any questions she may have.     Objective :  Temp:  [97.8 °F (36.6 °C)-99.3 °F (37.4 °C)] 97.8 °F (36.6 °C)  HR:  [63-81] 69  BP: (123-144)/(59-75) 144/75  Resp:  [16-18] " 18  SpO2:  [96 %-98 %] 97 %  O2 Device: None (Room air)    Body mass index is 31.88 kg/m².     Input and Output Summary (last 24 hours):     Intake/Output Summary (Last 24 hours) at 2/11/2025 1407  Last data filed at 2/11/2025 1221  Gross per 24 hour   Intake 600 ml   Output 350 ml   Net 250 ml       Physical Exam  Constitutional:       Appearance: Normal appearance.   HENT:      Head: Normocephalic and atraumatic.   Cardiovascular:      Rate and Rhythm: Normal rate and regular rhythm.      Pulses: Normal pulses.      Heart sounds: Normal heart sounds. No murmur heard.     No gallop.   Pulmonary:      Effort: Pulmonary effort is normal. No respiratory distress.      Breath sounds: Normal breath sounds. No wheezing, rhonchi or rales.   Abdominal:      General: Bowel sounds are normal. There is no distension.      Palpations: Abdomen is soft.      Tenderness: There is no abdominal tenderness. There is no guarding.   Musculoskeletal:      Cervical back: Neck supple.      Right lower leg: No edema.      Left lower leg: No edema.   Skin:     General: Skin is warm and dry.   Neurological:      General: No focal deficit present.      Mental Status: She is alert and oriented to person, place, and time.   Psychiatric:         Mood and Affect: Mood normal.         Behavior: Behavior normal.           Lines/Drains:              Lab Results: I have reviewed the following results:   Results from last 7 days   Lab Units 02/11/25  0643   WBC Thousand/uL 8.17   HEMOGLOBIN g/dL 12.4   HEMATOCRIT % 36.4   PLATELETS Thousands/uL 230   SEGS PCT % 77*   LYMPHO PCT % 12*   MONO PCT % 10   EOS PCT % 1     Results from last 7 days   Lab Units 02/11/25  0643 02/10/25  0647   SODIUM mmol/L 139 140   POTASSIUM mmol/L 3.7 3.7   CHLORIDE mmol/L 102 104   CO2 mmol/L 30 26   BUN mg/dL 9 11   CREATININE mg/dL 0.98 0.97   ANION GAP mmol/L 7 10   CALCIUM mg/dL 8.9 8.9   ALBUMIN g/dL  --  3.7   TOTAL BILIRUBIN mg/dL  --  0.93   ALK PHOS U/L  --  63    ALT U/L  --  7   AST U/L  --  10*   GLUCOSE RANDOM mg/dL 115 92                 Results from last 7 days   Lab Units 02/09/25  1730   LACTIC ACID mmol/L 1.5       Recent Cultures (last 7 days):   Results from last 7 days   Lab Units 02/09/25  1929   BLOOD CULTURE  No Growth at 24 hrs.  No Growth at 24 hrs.       Imaging Results Review: I reviewed radiology reports from this admission including: CT abdomen/pelvis.  Other Study Results Review: No additional pertinent studies reviewed.    Last 24 Hours Medication List:     Current Facility-Administered Medications:     acetaminophen (TYLENOL) tablet 650 mg, Q6H PRN    atorvastatin (LIPITOR) tablet 20 mg, Daily With Dinner    cefTRIAXone (ROCEPHIN) IVPB (premix in dextrose) 2,000 mg 50 mL, Q24H, Last Rate: 2,000 mg (02/10/25 1824)    Cholecalciferol (VITAMIN D3) tablet 2,000 Units, Daily    docusate sodium (COLACE) capsule 100 mg, BID    [Held by provider] enoxaparin (LOVENOX) subcutaneous injection 40 mg, Daily    hydroCHLOROthiazide tablet 12.5 mg, Daily    losartan (COZAAR) tablet 50 mg, Daily    metroNIDAZOLE (FLAGYL) tablet 500 mg, Q8H OFELIA    morphine injection 4 mg, Q4H PRN    ondansetron (ZOFRAN) injection 4 mg, Q6H PRN    pantoprazole (PROTONIX) EC tablet 40 mg, Daily Before Breakfast    Administrative Statements   Today, Patient Was Seen By: Sri Caro PA-C,      **Please Note: This note may have been constructed using a voice recognition system.**

## 2025-02-11 NOTE — ASSESSMENT & PLAN NOTE
Patient also has a hypodense lesion noted on pancreas on CT.  Follow-up per PCP and primary service.    Discussed with patient in detail regarding the above plan.

## 2025-02-11 NOTE — ASSESSMENT & PLAN NOTE
CT with 4 mm hypodense lesion in the pancreatic body. For simple cyst(s) less than 1.5 cm, recommend follow-up every 2 years for 5 times or to age 80, whichever comes first. Follow-up can stop at age 80 or can switch over to 80 year or older algorithm. Recommend next follow-up in 2 years.  Preferred imaging modality: abdomen MRI and MRCP with and without IV contrast, or triple phase abdomen CT with IV contrast, or abdomen MRI and MRCP without IV contrast.

## 2025-02-12 ENCOUNTER — APPOINTMENT (INPATIENT)
Dept: RADIOLOGY | Facility: HOSPITAL | Age: 70
DRG: 391 | End: 2025-02-12
Payer: MEDICARE

## 2025-02-12 ENCOUNTER — APPOINTMENT (INPATIENT)
Dept: RADIOLOGY | Facility: HOSPITAL | Age: 70
DRG: 391 | End: 2025-02-12
Attending: RADIOLOGY
Payer: MEDICARE

## 2025-02-12 LAB
ALBUMIN SERPL BCG-MCNC: 3.9 G/DL (ref 3.5–5)
ALP SERPL-CCNC: 57 U/L (ref 34–104)
ALT SERPL W P-5'-P-CCNC: 9 U/L (ref 7–52)
ANION GAP SERPL CALCULATED.3IONS-SCNC: 7 MMOL/L (ref 4–13)
AST SERPL W P-5'-P-CCNC: 12 U/L (ref 13–39)
BASOPHILS # BLD AUTO: 0.03 THOUSANDS/ÂΜL (ref 0–0.1)
BASOPHILS NFR BLD AUTO: 1 % (ref 0–1)
BILIRUB SERPL-MCNC: 0.67 MG/DL (ref 0.2–1)
BUN SERPL-MCNC: 9 MG/DL (ref 5–25)
CALCIUM SERPL-MCNC: 9.2 MG/DL (ref 8.4–10.2)
CHLORIDE SERPL-SCNC: 103 MMOL/L (ref 96–108)
CO2 SERPL-SCNC: 29 MMOL/L (ref 21–32)
CREAT SERPL-MCNC: 1.01 MG/DL (ref 0.6–1.3)
EOSINOPHIL # BLD AUTO: 0.12 THOUSAND/ÂΜL (ref 0–0.61)
EOSINOPHIL NFR BLD AUTO: 2 % (ref 0–6)
ERYTHROCYTE [DISTWIDTH] IN BLOOD BY AUTOMATED COUNT: 12.5 % (ref 11.6–15.1)
GFR SERPL CREATININE-BSD FRML MDRD: 56 ML/MIN/1.73SQ M
GLUCOSE SERPL-MCNC: 103 MG/DL (ref 65–140)
HCT VFR BLD AUTO: 36.4 % (ref 34.8–46.1)
HGB BLD-MCNC: 12.4 G/DL (ref 11.5–15.4)
IMM GRANULOCYTES # BLD AUTO: 0.04 THOUSAND/UL (ref 0–0.2)
IMM GRANULOCYTES NFR BLD AUTO: 1 % (ref 0–2)
LYMPHOCYTES # BLD AUTO: 1.22 THOUSANDS/ÂΜL (ref 0.6–4.47)
LYMPHOCYTES NFR BLD AUTO: 22 % (ref 14–44)
MCH RBC QN AUTO: 30.4 PG (ref 26.8–34.3)
MCHC RBC AUTO-ENTMCNC: 34.1 G/DL (ref 31.4–37.4)
MCV RBC AUTO: 89 FL (ref 82–98)
MONOCYTES # BLD AUTO: 0.61 THOUSAND/ÂΜL (ref 0.17–1.22)
MONOCYTES NFR BLD AUTO: 11 % (ref 4–12)
NEUTROPHILS # BLD AUTO: 3.57 THOUSANDS/ÂΜL (ref 1.85–7.62)
NEUTS SEG NFR BLD AUTO: 63 % (ref 43–75)
NRBC BLD AUTO-RTO: 0 /100 WBCS
PLATELET # BLD AUTO: 237 THOUSANDS/UL (ref 149–390)
PMV BLD AUTO: 9.1 FL (ref 8.9–12.7)
POTASSIUM SERPL-SCNC: 3.6 MMOL/L (ref 3.5–5.3)
PROT SERPL-MCNC: 6.5 G/DL (ref 6.4–8.4)
RBC # BLD AUTO: 4.08 MILLION/UL (ref 3.81–5.12)
SODIUM SERPL-SCNC: 139 MMOL/L (ref 135–147)
WBC # BLD AUTO: 5.59 THOUSAND/UL (ref 4.31–10.16)

## 2025-02-12 PROCEDURE — 76830 TRANSVAGINAL US NON-OB: CPT | Performed by: STUDENT IN AN ORGANIZED HEALTH CARE EDUCATION/TRAINING PROGRAM

## 2025-02-12 PROCEDURE — BW41ZZZ ULTRASONOGRAPHY OF ABDOMEN AND PELVIS: ICD-10-PCS | Performed by: STUDENT IN AN ORGANIZED HEALTH CARE EDUCATION/TRAINING PROGRAM

## 2025-02-12 PROCEDURE — G0545 PR INHERENT VISIT TO INPT: HCPCS | Performed by: INTERNAL MEDICINE

## 2025-02-12 PROCEDURE — 80053 COMPREHEN METABOLIC PANEL: CPT

## 2025-02-12 PROCEDURE — 99153 MOD SED SAME PHYS/QHP EA: CPT

## 2025-02-12 PROCEDURE — 99232 SBSQ HOSP IP/OBS MODERATE 35: CPT

## 2025-02-12 PROCEDURE — 85025 COMPLETE CBC W/AUTO DIFF WBC: CPT

## 2025-02-12 PROCEDURE — 99152 MOD SED SAME PHYS/QHP 5/>YRS: CPT | Performed by: STUDENT IN AN ORGANIZED HEALTH CARE EDUCATION/TRAINING PROGRAM

## 2025-02-12 PROCEDURE — 76830 TRANSVAGINAL US NON-OB: CPT

## 2025-02-12 PROCEDURE — 99232 SBSQ HOSP IP/OBS MODERATE 35: CPT | Performed by: INTERNAL MEDICINE

## 2025-02-12 PROCEDURE — 99152 MOD SED SAME PHYS/QHP 5/>YRS: CPT

## 2025-02-12 RX ORDER — FENTANYL CITRATE 50 UG/ML
INJECTION, SOLUTION INTRAMUSCULAR; INTRAVENOUS AS NEEDED
Status: DISCONTINUED | OUTPATIENT
Start: 2025-02-12 | End: 2025-02-13 | Stop reason: HOSPADM

## 2025-02-12 RX ORDER — MIDAZOLAM HYDROCHLORIDE 2 MG/2ML
INJECTION, SOLUTION INTRAMUSCULAR; INTRAVENOUS AS NEEDED
Status: DISCONTINUED | OUTPATIENT
Start: 2025-02-12 | End: 2025-02-13 | Stop reason: HOSPADM

## 2025-02-12 RX ADMIN — METRONIDAZOLE 500 MG: 500 TABLET ORAL at 05:52

## 2025-02-12 RX ADMIN — MIDAZOLAM HYDROCHLORIDE 1 MG: 1 INJECTION, SOLUTION INTRAMUSCULAR; INTRAVENOUS at 16:26

## 2025-02-12 RX ADMIN — LOSARTAN POTASSIUM 50 MG: 50 TABLET, FILM COATED ORAL at 09:25

## 2025-02-12 RX ADMIN — METRONIDAZOLE 500 MG: 500 TABLET ORAL at 20:55

## 2025-02-12 RX ADMIN — Medication 2000 UNITS: at 09:25

## 2025-02-12 RX ADMIN — FENTANYL CITRATE 50 MCG: 50 INJECTION, SOLUTION INTRAMUSCULAR; INTRAVENOUS at 16:14

## 2025-02-12 RX ADMIN — MIDAZOLAM HYDROCHLORIDE 1 MG: 1 INJECTION, SOLUTION INTRAMUSCULAR; INTRAVENOUS at 16:20

## 2025-02-12 RX ADMIN — CEFTRIAXONE 2000 MG: 2 INJECTION, SOLUTION INTRAVENOUS at 20:55

## 2025-02-12 RX ADMIN — FENTANYL CITRATE 50 MCG: 50 INJECTION, SOLUTION INTRAMUSCULAR; INTRAVENOUS at 16:20

## 2025-02-12 RX ADMIN — FENTANYL CITRATE 50 MCG: 50 INJECTION, SOLUTION INTRAMUSCULAR; INTRAVENOUS at 16:26

## 2025-02-12 RX ADMIN — PANTOPRAZOLE SODIUM 40 MG: 40 TABLET, DELAYED RELEASE ORAL at 05:52

## 2025-02-12 RX ADMIN — DOCUSATE SODIUM 100 MG: 100 CAPSULE, LIQUID FILLED ORAL at 09:25

## 2025-02-12 RX ADMIN — HYDROCHLOROTHIAZIDE 12.5 MG: 12.5 TABLET ORAL at 09:25

## 2025-02-12 RX ADMIN — MIDAZOLAM HYDROCHLORIDE 1 MG: 1 INJECTION, SOLUTION INTRAMUSCULAR; INTRAVENOUS at 16:14

## 2025-02-12 NOTE — SEDATION DOCUMENTATION
Transvaginal ultrasound performed by Dr. Enriquez and Francisco, sonographer. No complications and patient tolerated well. No procedure at this time. Patient transported by self and JUSTINE Cabrera with [facility] to [facility] RN. Patient to be transported to [facility] at 1700.

## 2025-02-12 NOTE — PROGRESS NOTES
Progress Note - Hospitalist   Name: Miley Locke 69 y.o. female I MRN: [id number]  Unit/Bed#:  I Date of Admission: 2/9/2025   Date of Service: 2/12/2025 I Hospital Day: 2    Assessment & Plan  Diverticulitis  Pt presenting with constipation and gas pain found to have the findings below on CT  Distal sigmoid diverticulitis complicated by 3 cm abscess.   Note that the abscess projects inferiorly from the sigmoid colon and is inseparable from the vaginal cuff status post hysterectomy.  Correlate clinically for any vaginal discharge to suggest a fistula.   Fluid contents within the cecum and distal small bowel, consistent with a diarrheal GI illness versus mild enterocolitis.  Possibly reactive to the above findings.    Patient seen with diverticulitis and 3 cm abscess   Remains afebrile wo leukocytosis    General surgery determined no need for surgical intervention at this time, recommending IR drainage.   For boomerang to [facility] IR transvaginal aspiration   Continue with IV abx with Rocephin and Flagyl   ID consult appreciated  D/w infectious disease, tentative plan for discharge tomorrow on Augmentin. Can adjust abx to culture accordingly outpatient   Resume diet after procedure   Analgesia for pain control.   Blood cultures negative at 48hrs     GERD (gastroesophageal reflux disease)  Continue with protonix 40 mg daily  Prediabetes  Monitor FS   Dyslipidemia  Continue with home dose of atorvastatin  Constipation  Last BM 2/10  Abnormal CT of the abdomen  CT with 4 mm hypodense lesion in the pancreatic body. For simple cyst(s) less than 1.5 cm, recommend follow-up every 2 years for 5 times or to age 80, whichever comes first. Follow-up can stop at age 80 or can switch over to 80 year or older algorithm. Recommend next follow-up in 2 years.  Preferred imaging modality: abdomen MRI and MRCP with and without IV contrast, or triple phase abdomen CT with IV contrast, or abdomen MRI and MRCP without IV  "contrast.  Hypertension  Continue losartan and HCTZ  Hypokalemia  Replete as indicated  Colonic diverticular abscess  See \"diverticulitis\"     VTE Pharmacologic Prophylaxis:   lovenox    Mobility:   Basic Mobility Inpatient Raw Score: 24  JH-HLM Goal: 8: Walk 250 feet or more  JH-HLM Achieved: 7: Walk 25 feet or more  JH-HLM Goal NOT achieved. Continue with multidisciplinary rounding and encourage appropriate mobility to improve upon JH-HLM goals.    Patient Centered Rounds: I performed bedside rounds with nursing staff today.   Discussions with Specialists or Other Care Team Provider: case management, ID    Education and Discussions with Family / Patient: Patient declined call to .     Current Length of Stay: 2 day(s)  Current Patient Status: Inpatient   Certification Statement: The patient will continue to require additional inpatient hospital stay due to IR aspiration of sigmoid abscess  Discharge Plan: Anticipate discharge tomorrow to home.    Code Status: Level 1 - Full Code    Subjective   Patient reports feeling well today. Tolerated FLD yesterday. Remains febrile. No abdominal pain. NO chest pain or SOB    Objective :  Temp:  [97.4 °F (36.3 °C)-98.8 °F (37.1 °C)] 98.8 °F (37.1 °C)  HR:  [69-80] 70  BP: (131-144)/(50-75) 131/50  Resp:  [16-20] 20  SpO2:  [98 %] 98 %  O2 Device: None (Room air)    Body mass index is 31.88 kg/m².     Input and Output Summary (last 24 hours):     Intake/Output Summary (Last 24 hours) at 2/12/2025 0935  Last data filed at 2/11/2025 1500  Gross per 24 hour   Intake 360 ml   Output --   Net 360 ml       Physical Exam  Constitutional:       General: She is not in acute distress.  HENT:      Head: Normocephalic and atraumatic.   Cardiovascular:      Rate and Rhythm: Normal rate and regular rhythm.   Pulmonary:      Effort: Pulmonary effort is normal.      Breath sounds: Normal breath sounds.   Abdominal:      General: Abdomen is flat. Bowel sounds are normal. There is " no distension.      Palpations: Abdomen is soft.      Tenderness: There is no abdominal tenderness.   Skin:     General: Skin is warm and dry.   Neurological:      General: No focal deficit present.      Mental Status: She is alert and oriented to person, place, and time.           Lines/Drains:              Lab Results: I have reviewed the following results:   Results from last 7 days   Lab Units 02/12/25  0558   WBC Thousand/uL 5.59   HEMOGLOBIN g/dL 12.4   HEMATOCRIT % 36.4   PLATELETS Thousands/uL 237   SEGS PCT % 63   LYMPHO PCT % 22   MONO PCT % 11   EOS PCT % 2     Results from last 7 days   Lab Units 02/12/25  0558   SODIUM mmol/L 139   POTASSIUM mmol/L 3.6   CHLORIDE mmol/L 103   CO2 mmol/L 29   BUN mg/dL 9   CREATININE mg/dL 1.01   ANION GAP mmol/L 7   CALCIUM mg/dL 9.2   ALBUMIN g/dL 3.9   TOTAL BILIRUBIN mg/dL 0.67   ALK PHOS U/L 57   ALT U/L 9   AST U/L 12*   GLUCOSE RANDOM mg/dL 103                 Results from last 7 days   Lab Units 02/09/25  1730   LACTIC ACID mmol/L 1.5       Recent Cultures (last 7 days):   Results from last 7 days   Lab Units 02/09/25  1929   BLOOD CULTURE  No Growth at 48 hrs.  No Growth at 48 hrs.       Imaging Results Review: I reviewed radiology reports from this admission including: CT abdomen/pelvis.  Other Study Results Review: No additional pertinent studies reviewed.    Last 24 Hours Medication List:     Current Facility-Administered Medications:     acetaminophen (TYLENOL) tablet 650 mg, Q6H PRN    atorvastatin (LIPITOR) tablet 20 mg, Daily With Dinner    cefTRIAXone (ROCEPHIN) IVPB (premix in dextrose) 2,000 mg 50 mL, Q24H, Last Rate: 2,000 mg (02/11/25 1950)    Cholecalciferol (VITAMIN D3) tablet 2,000 Units, Daily    docusate sodium (COLACE) capsule 100 mg, BID    [Held by provider] enoxaparin (LOVENOX) subcutaneous injection 40 mg, Daily    hydroCHLOROthiazide tablet 12.5 mg, Daily    losartan (COZAAR) tablet 50 mg, Daily    metroNIDAZOLE (FLAGYL) tablet 500 mg,  Q8H OFELIA    morphine injection 4 mg, Q4H PRN    ondansetron (ZOFRAN) injection 4 mg, Q6H PRN    pantoprazole (PROTONIX) EC tablet 40 mg, Daily Before Breakfast    Administrative Statements   Today, Patient Was Seen By: Sri Caro PA-C      **Please Note: This note may have been constructed using a voice recognition system.**

## 2025-02-12 NOTE — ASSESSMENT & PLAN NOTE
Patient presented with abdominal pain, with CT showing sigmoid diverticulitis and a peridiverticular abscess.  Patient notes already clinically much improved on antibiotics and remains systemically well.  Surgery evaluated patient and felt that surgery is not a necessary.  Patient is clinically improved on current antibiotic regimen.  IR evaluation noted.  No good window for percutaneous drainage.  Patient is scheduled to undergo transvaginal drainage later today.  Change antibiotic to IV Unasyn, in preparation for transition to p.o Augmentin in the next 1 to 2 days.  Monitor temperature/WBC.  Monitor abdominal pain.  Follow-up on findings at transvaginal abscess drainage.  Follow-up on abscess culture.  Treat x 10 days post drainage.

## 2025-02-12 NOTE — INTERVAL H&P NOTE
Update:     Patient seen and re-evaluated.    She presented with symptoms similar to a prior small bowel obstruction. These symptoms have resolved after starting antibiotics, and she has felt well since Monday during the day.     She has no fever, chills, or other signs of infection and really feels quite well at this point.  She understands that there was an abscess seen on CT and that aspiration/drainage of the abscess, if it is still there, could help her fully recover from the infection more quickly.  If it is absent, not identifiable, or the risk of needle puncture is too high, we would not proceed with a procedure.    We have discussed transvaginal ultrasound evaluation, possible aspiration or drainage, and she is agreeable to proceeding.    Ladarius Enriquez MD/February 12, 2025/4:10 PM

## 2025-02-12 NOTE — ASSESSMENT & PLAN NOTE
69-year-old female with sigmoid diverticulitis and abscess, this is her first bout.  Denies any abdominal pain.  Having watery bowel movements.  Afebrile.  Remains NPO.  Patient for [facility] tertiary transfer to [facility] for IR drainage of pelvic abscess today.  Last colonoscopy about 5 years ago.    ID consult appreciated, patient remains on IV ceftriaxone and metronidazole.  ID recommended transition to p.o. Augmentin when further clinically improved.    CTAP +distal sigmoid diverticulitis complicated by 3 cm abscess which appears to project inferiorly from the sigmoid colon and is inseparable from the vaginal cuff status post hysterectomy.  Possible colovaginal fistula.  Patient denies any vaginal discharge.    Vital signs reviewed, afebrile.    Patient comfortable, had 2-3 loose watery bowel movement yesterday.    Labs showed normal white count 5.59.  Hemoglobin 12.4.  CMP normal electrolytes.  Cr 1.01 Mag 2.1.  BC x 2 NG 24h    History of illness as follows:    69-year-old female with previous history of total abdominal hysterectomy in the past was seen at urgent care in [place] now 7 days ago because of abdominal cramping, decreased appetite and constipation.  She was discharged from urgent care and told to take Gas-X 4 times a day which did not improve her abdominal cramping and bloating.  She presented here to the ED on Saturday, 2 days ago.  Denied any fever or chills.  No previous history of similar symptoms.  Last colonoscopy was 5 years ago.  She was not told in the past that she had diverticulosis.  She was hospitalized here 10 or 12 years ago with small bowel obstruction resolved with conservative nonoperative management, NG tube decompression.    She denies any vaginal discharge.  No fever or chills.    PLAN:    General Surgery continue to follow, no plans for surgical intervention at this time.  Appreciate ID evaluation for recommended antibiotic therapy and duration  Keep n.p.o. for  IR procedure today, may advance diet upon return back later today  Hold apixaban  For boomerang transfer to [facility] today, IR drainage of abscess.  Await body fluid culture of abscess fluid to help tailor antibiotic therapy  Continue present IV ceftriaxone and metronidazole per ID recommendation and eventual transition to p.o. Augmentin to complete antibiotic course of duration.  Repeat a.m. labs including CBC, BMP  Monitor and replete electrolytes as needed  Serial abdominal exams  Colace twice daily  Discussed and updated in person with Dr. Clint Mendoza via telephone.  Remainder medical management per the attending primary hospitalist service  Eventual outpatient colonoscopy by GI in about 6 weeks after current bout of diverticulitis has resolved and she completed antibiotic treatment.

## 2025-02-12 NOTE — PROGRESS NOTES
Progress Note - General Surgery  Name: Miley Locke 69 y.o. female I MRN: [id number]  Unit/Bed#:  I Date of Admission: 2/9/2025   Date of Service: 2/12/2025 I Hospital Day: 2     Assessment & Plan  Diverticulitis    69-year-old female with sigmoid diverticulitis and abscess, this is her first bout.  Denies any abdominal pain.  Having watery bowel movements.  Afebrile.  Remains NPO.  Patient for [facility] tertiary transfer to [facility] for IR drainage of pelvic abscess today.  Last colonoscopy about 5 years ago.    ID consult appreciated, patient remains on IV ceftriaxone and metronidazole.  ID recommended transition to p.o. Augmentin when further clinically improved.    CTAP +distal sigmoid diverticulitis complicated by 3 cm abscess which appears to project inferiorly from the sigmoid colon and is inseparable from the vaginal cuff status post hysterectomy.  Possible colovaginal fistula.  Patient denies any vaginal discharge.    Vital signs reviewed, afebrile.    Patient comfortable, had 2-3 loose watery bowel movement yesterday.    Labs showed normal white count 5.59.  Hemoglobin 12.4.  CMP normal electrolytes.  Cr 1.01 Mag 2.1.  BC x 2 NG 24h    History of illness as follows:    69-year-old female with previous history of total abdominal hysterectomy in the past was seen at urgent care in [place] now 7 days ago because of abdominal cramping, decreased appetite and constipation.  She was discharged from urgent care and told to take Gas-X 4 times a day which did not improve her abdominal cramping and bloating.  She presented here to the ED on Saturday, 2 days ago.  Denied any fever or chills.  No previous history of similar symptoms.  Last colonoscopy was 5 years ago.  She was not told in the past that she had diverticulosis.  She was hospitalized here 10 or 12 years ago with small bowel obstruction resolved with conservative nonoperative management, NG tube decompression.    She denies any  vaginal discharge.  No fever or chills.    PLAN:    General Surgery continue to follow, no plans for surgical intervention at this time.  Appreciate ID evaluation for recommended antibiotic therapy and duration  Keep n.p.o. for IR procedure today, may advance diet upon return back later today  Hold apixaban  For boomerang transfer to [facility] today, IR drainage of abscess.  Await body fluid culture of abscess fluid to help tailor antibiotic therapy  Continue present IV ceftriaxone and metronidazole per ID recommendation and eventual transition to p.o. Augmentin to complete antibiotic course of duration.  Repeat a.m. labs including CBC, BMP  Monitor and replete electrolytes as needed  Serial abdominal exams  Colace twice daily  Discussed and updated in person with Dr. Clint Mendoza via telephone.  Remainder medical management per the attending primary hospitalist service  Eventual outpatient colonoscopy by GI in about 6 weeks after current bout of diverticulitis has resolved and she completed antibiotic treatment.  GERD (gastroesophageal reflux disease)  Stable, remains on PPI.  Prediabetes  Noted, outpatient follow-up with PCP  Dyslipidemia  Management per the attending primary hospitalist service  Constipation  No bowel movement since admission.  Abnormal CT of the abdomen  As noted below.  Hypertension  Management per the attending primary service.  Hypokalemia  Initial K3.0, patient received electrolyte repletion.  Repeated K today normal 3.7  Colonic diverticular abscess  Clinical management and recommendations as noted above.  Patient for possible IR drainage later today.  Patient remains n.p.o. pending disposition from IR evaluation.    Surgery-General service will follow.    Subjective     69-year-old female with sigmoid diverticulitis with abscess, no overnight events.  Currently NPO.  Tolerated full liquids yesterday.  Passing gas and 2 or 3 loose watery bowel movements yesterday.  No fever.  Patient recommended  IR drainage of abscess, plan tertiary transfer and return to/from [place] today for transvaginal approach to drain pelvic abscess by interventional radiology there.    Scheduled Meds:  Current Facility-Administered Medications   Medication Dose Route Frequency Provider Last Rate    acetaminophen  650 mg Oral Q6H PRN Ana Ferguson MD      atorvastatin  20 mg Oral Daily With Dinner Ana Ferguson MD      cefTRIAXone  2,000 mg Intravenous Q24H Ana Ferguson MD 2,000 mg (02/11/25 1950)    Cholecalciferol  2,000 Units Oral Daily Ana Ferguson MD      docusate sodium  100 mg Oral BID Tariq Rodriguez PA-C      [Held by provider] enoxaparin  40 mg Subcutaneous Daily Ana Ferguson MD      hydroCHLOROthiazide  12.5 mg Oral Daily Ana Ferguson MD      losartan  50 mg Oral Daily Ana Ferguson MD      metroNIDAZOLE  500 mg Oral Q8H [facility] Fabrizio Velarde MD      morphine injection  4 mg Intravenous Q4H PRN Ana Ferguson MD      ondansetron  4 mg Intravenous Q6H PRN Ana Ferguson MD      pantoprazole  40 mg Oral Daily Before Breakfast Ana Ferguson MD       Continuous Infusions:   PRN Meds:.  acetaminophen    morphine injection    ondansetron      Objective :  Temp:  [97.4 °F (36.3 °C)-98.8 °F (37.1 °C)] 98.8 °F (37.1 °C)  HR:  [69-80] 70  BP: (131-144)/(50-75) 131/50  Resp:  [16-20] 20  SpO2:  [98 %] 98 %  O2 Device: None (Room air)    I/O         02/10 0701 02/11 0700 02/11 0701 02/12 0700 02/12 0701 02/13 0700    P.O. 1200 360     I.V. (mL/kg)       IV Piggyback       Total Intake(mL/kg) 1200 (13.8) 360 (4.1)     Urine (mL/kg/hr) 700 (0.3)      Total Output 700      Net +500 +360                    Physical Exam    HENT:      Head: Normocephalic.      Nose: Nose normal.      Mouth/Throat:      Mouth: Mucous membranes are moist.   Cardiovascular:      Rate and Rhythm: Regular rhythm.      Heart sounds: Normal heart sounds. No murmur heard.  Pulmonary:      Breath sounds:  "Normal breath sounds. No wheezing or rales.   Abdominal:      General: Bowel sounds are normal. There is no distension.      Palpations: There is no mass.      Tenderness: Entirely nontender abdomen today.  No guarding or rebound.      Hernia: No palpable hernia.  Musculoskeletal:         General: No swelling, tenderness, deformity or signs of injury.   Skin:     Capillary Refill: Capillary refill takes less than 2 seconds.      Coloration: Skin is not pale.      Findings: No erythema or rash.   Neurological:      General: No focal deficit present.      Mental Status: She is alert.      Motor: No weakness.   Psychiatric:         Mood and Affect: Mood normal.         Thought Content: Thought content normal.         Judgment: Judgment normal.       Lab Results: I have reviewed the following results:  Recent Labs     02/09/25  1730 02/10/25  0647 02/11/25  0643 02/12/25  0558   WBC 9.77   < > 8.17 5.59   HGB 13.9   < > 12.4 12.4   HCT 40.5   < > 36.4 36.4      < > 230 237   SODIUM 136   < > 139 139   K 3.0*   < > 3.7 3.6   CL 96   < > 102 103   CO2 28   < > 30 29   BUN 14   < > 9 9   CREATININE 1.13   < > 0.98 1.01   GLUC 112   < > 115 103   MG  --   --  2.1  --    AST 12*   < >  --  12*   ALT 9   < >  --  9   ALB 4.2   < >  --  3.9   TBILI 1.23*   < >  --  0.67   ALKPHOS 68   < >  --  57   LACTICACID 1.5  --   --   --     < > = values in this interval not displayed.       Imaging Results Review: I reviewed radiology reports from this admission including: CT abdomen/pelvis.  Other Study Results Review: No additional pertinent studies reviewed.    VTE Pharmacologic Prophylaxis: Reason for no pharmacologic prophylaxis apixaban on hold for IR procedure.  VTE Mechanical Prophylaxis: sequential compression device    Tariq Rodriguez PA-C    **Please Note: Portions of the record may have been created using voice recognition software.  Occasional wrong word or \"sound a like\" substitutions may have occurred due to the " inherent limitations of voice recognition software.  Read the chart carefully and recognize, using context, where substitutions have occurred.**

## 2025-02-12 NOTE — BRIEF OP NOTE (RAD/CATH)
INTERVENTIONAL RADIOLOGY PROCEDURE NOTE    Date: 2/12/2025    Procedure:   Procedure Summary       Date:  Room / Location:     Anesthesia Start:  Anesthesia Stop:     Procedure:  Diagnosis:     Scheduled Providers:  Responsible Provider:     Anesthesia Type: Not recorded ASA Status: Not recorded            Preoperative diagnosis:   1. Diverticulitis         Postoperative diagnosis: Same.    Surgeon: Ladarius Enriquez MD     Assistant: None. No qualified resident was available.    Blood loss: None    Specimens: None     Findings:   Transvaginal ultrasound demonstrated distended bladder with thickened colon wall, likely consistent with infectious/inflammatory changes.  No drainable fluid collection.    Findings discussed with the patient and she was reassured to hear that no intervention was required.    Complications: None immediate.    Anesthesia: conscious sedation

## 2025-02-12 NOTE — ASSESSMENT & PLAN NOTE
Pt presenting with constipation and gas pain found to have the findings below on CT  Distal sigmoid diverticulitis complicated by 3 cm abscess.   Note that the abscess projects inferiorly from the sigmoid colon and is inseparable from the vaginal cuff status post hysterectomy.  Correlate clinically for any vaginal discharge to suggest a fistula.   Fluid contents within the cecum and distal small bowel, consistent with a diarrheal GI illness versus mild enterocolitis.  Possibly reactive to the above findings.    Patient seen with diverticulitis and 3 cm abscess   Remains afebrile wo leukocytosis    General surgery determined no need for surgical intervention at this time, recommending IR drainage.   For boomerang to [facility] IR transvaginal aspiration   Continue with IV abx with Rocephin and Flagyl   ID consult appreciated  D/w infectious disease, tentative plan for discharge tomorrow on Augmentin. Can adjust abx to culture accordingly outpatient   Resume diet after procedure   Analgesia for pain control.   Blood cultures negative at 48hrs

## 2025-02-12 NOTE — DISCHARGE INSTRUCTIONS
Moderate Sedation   WHAT YOU NEED TO KNOW:   Moderate sedation, or conscious sedation, is medicine used during procedures to help you feel relaxed and calm. You will be awake and able to follow directions without anxiety or pain. You will remember little to none of the procedure. You may feel tired, weak, or unsteady on your feet after you get sedation. You may also have trouble concentrating or short-term memory loss. These symptoms should go away in 24 hours or less.   DISCHARGE INSTRUCTIONS:   Call 911 or have someone else call for any of the following:   You have sudden trouble breathing.     You cannot be woken.  Seek care immediately if:   You have a severe headache or dizziness.     Your heart is beating faster than usual.  Contact your healthcare provider if:   You have a fever.     You have nausea or are vomiting for more than 8 hours after the procedure.      Your skin is itchy, swollen, or you have a rash.     You have questions or concerns about your condition or care.  Self-care:   Have someone stay with you for 24 hours. This person can drive you to errands and help you do things around the house. This person can also watch for problems.      Rest and do quiet activities for 24 hours. Do not exercise, ride a bike, or play sports. Stand up slowly to prevent dizziness and falls. Take short walks around the house with another person. Slowly return to your usual activities the next day.      Do not drive or use dangerous machines or tools for 24 hours. You may injure yourself or others. Examples include a lawnmower, saw, or drill. Do not return to work for 24 hours if you use dangerous machines or tools for work.      Do not make important decisions for 24 hours. For example, do not sign important papers or invest money.      Drink liquids as directed. Liquids help flush the sedation medicine out of your body. Ask how much liquid to drink each day and which liquids are best for you.      Eat small,  frequent meals to prevent nausea and vomiting. Start with clear liquids such as juice or broth. If you do not vomit after clear liquids, you can eat your usual foods.      Do not drink alcohol or take medicines that make you drowsy. This includes medicines that help you sleep and anxiety medicines. Ask your healthcare provider if it is safe for you to take pain medicine.  Follow up with your healthcare provider as directed: Write down your questions so you remember to ask them during your visits.   © 2017 [employer] Information is for End User's use only and may not be sold, redistributed or otherwise used for commercial purposes. All illustrations and images included in CareNotes® are the copyrighted property of [employer] or [employer].  The above information is an  only. It is not intended as medical advice for individual conditions or treatments. Talk to your doctor, nurse or pharmacist before following any medical regimen to see if it is safe and effective for you.

## 2025-02-12 NOTE — PROGRESS NOTES
Progress Note - Infectious Disease   Name: Miley Locke 69 y.o. female I MRN: [id number]  Unit/Bed#:  I Date of Admission: 2/9/2025   Date of Service: 2/12/2025 I Hospital Day: 2      Administrative Statements   VIRTUAL CARE DOCUMENTATION:     1. This service was provided via Telemedicine using [employer]     2. Parties in the room with patient during teleconsult Patient only    3. Confidentiality My office door was closed     4. Participants No one else was in the room    5. Patient acknowledged consent and understanding of privacy and security of the  Telemedicine consult. I informed the patient that I have reviewed their record in Epic and presented the opportunity for them to ask any questions regarding the visit today.  The patient agreed to participate.    6. I have spent a total time of 45 minutes in caring for this patient on the day of the visit/encounter including Diagnostic results, Impressions, Counseling / Coordination of care, Documenting in the medical record, Reviewing / ordering tests, medicine, procedures  , Obtaining or reviewing history  , and Communicating with other healthcare professionals , not including the time spent for establishing the audio/video connection.      Assessment & Plan  Colonic diverticular abscess  Patient presented with abdominal pain, with CT showing sigmoid diverticulitis and a peridiverticular abscess.  Patient notes already clinically much improved on antibiotics and remains systemically well.  Surgery evaluated patient and felt that surgery is not a necessary.  Patient is clinically improved on current antibiotic regimen.  IR evaluation noted.  No good window for percutaneous drainage.  Patient is scheduled to undergo transvaginal drainage later today.  Change antibiotic to IV Unasyn, in preparation for transition to p.o Augmentin in the next 1 to 2 days.  Monitor temperature/WBC.  Monitor abdominal pain.  Follow-up on findings at transvaginal abscess  drainage.  Follow-up on abscess culture.  Treat x 10 days post drainage.  Diverticulitis  Patient has no history of diverticulitis.  By her report, she had colonoscopy 5 years ago and was told it was normal.  Patient will need repeat colonoscopy in a few weeks to assess for evidence of diverticulosis.  Antibiotic plan as in above.  Recommend repeat colonoscopy in a few weeks.  Abnormal CT of the abdomen  Patient also has a hypodense lesion noted on pancreas on CT.  Follow-up per PCP and primary service.    Discussed with patient in detail regarding the above plan.  I have discussed with Sri Caro PA-C from primary service the above plan to continue IV antibiotic, with likely transition to p.o. antibiotic in the next 1 to 2 days.  She agrees with the plan.  Discussed with Dr. Tay from IR earlier.    Antibiotics:  Ceftriaxone/Flagyl # 4    Subjective   Patient feels well.  Abdominal pain continues to improve.  Temperature stays down.  No chills.  She is tolerating antibiotics well.  No nausea, vomiting or diarrhea.    Objective :  Temp:  [97.4 °F (36.3 °C)-98.8 °F (37.1 °C)] 98.3 °F (36.8 °C)  HR:  [70-80] 70  BP: (130-137)/(50-80) 130/80  Resp:  [16-20] 18  SpO2:  [97 %-98 %] 97 %  O2 Device: None (Room air)    Physical exam has been primarily done by the patient's nurse and/or the primary service due to limited examination abilities on telemedicine.     General:  No acute distress  Psychiatric:  Awake and alert  Pulmonary:  Normal respiratory excursion without accessory muscle use  Abdomen:  Soft, nontender  Extremities:  No edema  Skin:  No rashes      Lab Results: I have reviewed the following results:  Results from last 7 days   Lab Units 02/12/25  0558 02/11/25  0643 02/10/25  0647   WBC Thousand/uL 5.59 8.17 8.80   HEMOGLOBIN g/dL 12.4 12.4 12.6   PLATELETS Thousands/uL 237 230 236     Results from last 7 days   Lab Units 02/12/25  0558 02/11/25  0643 02/10/25  0647 02/09/25  1730   SODIUM mmol/L 139  139 140 136   POTASSIUM mmol/L 3.6 3.7 3.7 3.0*   CHLORIDE mmol/L 103 102 104 96   CO2 mmol/L 29 30 26 28   BUN mg/dL 9 9 11 14   CREATININE mg/dL 1.01 0.98 0.97 1.13   EGFR ml/min/1.73sq m 56 58 58 49   CALCIUM mg/dL 9.2 8.9 8.9 9.6   AST U/L 12*  --  10* 12*   ALT U/L 9  --  7 9   ALK PHOS U/L 57  --  63 68   ALBUMIN g/dL 3.9  --  3.7 4.2     Results from last 7 days   Lab Units 02/09/25  1929   BLOOD CULTURE  No Growth at 48 hrs.  No Growth at 48 hrs.

## 2025-02-13 ENCOUNTER — TELEPHONE (OUTPATIENT)
Dept: INFECTIOUS DISEASES | Facility: CLINIC | Age: 70
End: 2025-02-13

## 2025-02-13 VITALS
TEMPERATURE: 98 F | OXYGEN SATURATION: 98 % | RESPIRATION RATE: 18 BRPM | HEIGHT: 65 IN | WEIGHT: 191.6 LBS | SYSTOLIC BLOOD PRESSURE: 111 MMHG | BODY MASS INDEX: 31.92 KG/M2 | DIASTOLIC BLOOD PRESSURE: 58 MMHG | HEART RATE: 62 BPM

## 2025-02-13 DIAGNOSIS — R93.5 ABNORMAL CT OF THE ABDOMEN: ICD-10-CM

## 2025-02-13 DIAGNOSIS — K57.92 DIVERTICULITIS: Primary | ICD-10-CM

## 2025-02-13 DIAGNOSIS — Z79.2 LONG TERM (CURRENT) USE OF ANTIBIOTICS: ICD-10-CM

## 2025-02-13 DIAGNOSIS — K57.20 COLONIC DIVERTICULAR ABSCESS: ICD-10-CM

## 2025-02-13 LAB
ALBUMIN SERPL BCG-MCNC: 3.9 G/DL (ref 3.5–5)
ALP SERPL-CCNC: 54 U/L (ref 34–104)
ALT SERPL W P-5'-P-CCNC: 11 U/L (ref 7–52)
ANION GAP SERPL CALCULATED.3IONS-SCNC: 9 MMOL/L (ref 4–13)
AST SERPL W P-5'-P-CCNC: 19 U/L (ref 13–39)
BASOPHILS # BLD AUTO: 0.08 THOUSANDS/ÂΜL (ref 0–0.1)
BASOPHILS NFR BLD AUTO: 2 % (ref 0–1)
BILIRUB SERPL-MCNC: 0.51 MG/DL (ref 0.2–1)
BUN SERPL-MCNC: 13 MG/DL (ref 5–25)
CALCIUM SERPL-MCNC: 9.3 MG/DL (ref 8.4–10.2)
CHLORIDE SERPL-SCNC: 101 MMOL/L (ref 96–108)
CO2 SERPL-SCNC: 29 MMOL/L (ref 21–32)
CREAT SERPL-MCNC: 1.04 MG/DL (ref 0.6–1.3)
EOSINOPHIL # BLD AUTO: 0.17 THOUSAND/ÂΜL (ref 0–0.61)
EOSINOPHIL NFR BLD AUTO: 3 % (ref 0–6)
ERYTHROCYTE [DISTWIDTH] IN BLOOD BY AUTOMATED COUNT: 12.6 % (ref 11.6–15.1)
GFR SERPL CREATININE-BSD FRML MDRD: 54 ML/MIN/1.73SQ M
GLUCOSE SERPL-MCNC: 101 MG/DL (ref 65–140)
HCT VFR BLD AUTO: 38.6 % (ref 34.8–46.1)
HGB BLD-MCNC: 13.2 G/DL (ref 11.5–15.4)
IMM GRANULOCYTES # BLD AUTO: 0.05 THOUSAND/UL (ref 0–0.2)
IMM GRANULOCYTES NFR BLD AUTO: 1 % (ref 0–2)
LYMPHOCYTES # BLD AUTO: 1.34 THOUSANDS/ÂΜL (ref 0.6–4.47)
LYMPHOCYTES NFR BLD AUTO: 27 % (ref 14–44)
MCH RBC QN AUTO: 30.8 PG (ref 26.8–34.3)
MCHC RBC AUTO-ENTMCNC: 34.2 G/DL (ref 31.4–37.4)
MCV RBC AUTO: 90 FL (ref 82–98)
MONOCYTES # BLD AUTO: 0.65 THOUSAND/ÂΜL (ref 0.17–1.22)
MONOCYTES NFR BLD AUTO: 13 % (ref 4–12)
NEUTROPHILS # BLD AUTO: 2.67 THOUSANDS/ÂΜL (ref 1.85–7.62)
NEUTS SEG NFR BLD AUTO: 54 % (ref 43–75)
NRBC BLD AUTO-RTO: 0 /100 WBCS
PLATELET # BLD AUTO: 274 THOUSANDS/UL (ref 149–390)
PMV BLD AUTO: 9.3 FL (ref 8.9–12.7)
POTASSIUM SERPL-SCNC: 3.7 MMOL/L (ref 3.5–5.3)
PROT SERPL-MCNC: 6.6 G/DL (ref 6.4–8.4)
RBC # BLD AUTO: 4.28 MILLION/UL (ref 3.81–5.12)
SODIUM SERPL-SCNC: 139 MMOL/L (ref 135–147)
WBC # BLD AUTO: 4.96 THOUSAND/UL (ref 4.31–10.16)

## 2025-02-13 PROCEDURE — 85025 COMPLETE CBC W/AUTO DIFF WBC: CPT

## 2025-02-13 PROCEDURE — 80053 COMPREHEN METABOLIC PANEL: CPT

## 2025-02-13 PROCEDURE — 99232 SBSQ HOSP IP/OBS MODERATE 35: CPT | Performed by: SURGERY

## 2025-02-13 PROCEDURE — G0545 PR INHERENT VISIT TO INPT: HCPCS | Performed by: INTERNAL MEDICINE

## 2025-02-13 PROCEDURE — 99232 SBSQ HOSP IP/OBS MODERATE 35: CPT | Performed by: INTERNAL MEDICINE

## 2025-02-13 PROCEDURE — 99239 HOSP IP/OBS DSCHRG MGMT >30: CPT | Performed by: PHYSICIAN ASSISTANT

## 2025-02-13 RX ADMIN — ENOXAPARIN SODIUM 40 MG: 40 INJECTION SUBCUTANEOUS at 09:03

## 2025-02-13 RX ADMIN — DOCUSATE SODIUM 100 MG: 100 CAPSULE, LIQUID FILLED ORAL at 09:03

## 2025-02-13 RX ADMIN — Medication 2000 UNITS: at 09:03

## 2025-02-13 RX ADMIN — HYDROCHLOROTHIAZIDE 12.5 MG: 12.5 TABLET ORAL at 09:03

## 2025-02-13 RX ADMIN — LOSARTAN POTASSIUM 50 MG: 50 TABLET, FILM COATED ORAL at 09:03

## 2025-02-13 RX ADMIN — METRONIDAZOLE 500 MG: 500 TABLET ORAL at 05:57

## 2025-02-13 RX ADMIN — PANTOPRAZOLE SODIUM 40 MG: 40 TABLET, DELAYED RELEASE ORAL at 05:57

## 2025-02-13 NOTE — ASSESSMENT & PLAN NOTE
Patient underwent transvaginal ultrasound yesterday by IR in [place], distended bladder with thickened colon wall likely consistent with infectious/inflammatory changes.  No drainable fluid collection.    She remains free of any abdominal pain.  Having loose bowel movements.  Tolerating diet.    Vital signs normal, afebrile.    White count today 4.96  Hemoglobin 13.2  CMP normal electrolytes and LFTs.    PLAN:    Patient is stable  for hospital discharge today from a surgical standpoint.  Discussed and updated via telephone with Dr. Clint Mendoza this morning.  No indication for any general surgical intervention during inpatient hospital stay.    Recommendations per ID regarding transition to p.o. Augmentin twice daily and duration of therapy.  Obtain outpatient CT scan abdomen and pelvis with contrast in 1 month  Follow-up with Dr. Clint Mendoza in the general surgery office after CT scan obtained.  Remainder medical management per the attending primary hospitalist service, updated Lorelei Benson PA-C and Dr. Ca.    Eventual outpatient colonoscopy by GI in about 6 weeks after current bout of diverticulitis has resolved and she completed antibiotic treatment.      69-year-old female with sigmoid diverticulitis and abscess, this is her first bout.  Denies any abdominal pain.  Having watery bowel movements.  Afebrile.  Remains NPO.  Patient for [facility] tertiary transfer to [facility] for IR drainage of pelvic abscess today.  Last colonoscopy about 5 years ago.    ID consult appreciated, patient remains on IV ceftriaxone and metronidazole.  ID recommended transition to p.o. Augmentin when further clinically improved.    CTAP +distal sigmoid diverticulitis complicated by 3 cm abscess which appears to project inferiorly from the sigmoid colon and is inseparable from the vaginal cuff status post hysterectomy.  Possible colovaginal fistula.  Patient denies any vaginal discharge.    Vital signs reviewed,  afebrile.    Patient comfortable, having loose BMs.  Tolerating diet.  Labs showed normal white count 5.59.  Hemoglobin 12.4.  CMP normal electrolytes.  Cr 1.01 Mag 2.1.  BC x 2 NG 24h    History of illness as follows:    69-year-old female with previous history of total abdominal hysterectomy in the past was seen at urgent care in [place] now 7 days ago because of abdominal cramping, decreased appetite and constipation.  She was discharged from urgent care and told to take Gas-X 4 times a day which did not improve her abdominal cramping and bloating.  She presented here to the ED on Saturday, 2 days ago.  Denied any fever or chills.  No previous history of similar symptoms.  Last colonoscopy was 5 years ago.  She was not told in the past that she had diverticulosis.  She was hospitalized here 10 or 12 years ago with small bowel obstruction resolved with conservative nonoperative management, NG tube decompression.    She denies any vaginal discharge.  No fever or chills.   Our Emergency Department Referral Coordinators will be reaching out to you in the next 24-48 hours from 9:00am to 5:00pm with a follow up appointment. Please expect a phone call from the hospital in that time frame. If you do not receive a call or if you have any questions or concerns, you can reach them at   [phone]    SOMETIMES INFECTIONS CAN GET WORSE DESPITE BEING ON ORAL ANTIBIOTICS. IN THIS CASE YOU WILL NEED IV ANTIBIOTICS. IF YOU DEVELOP WORSENING PAIN OR SWELLING, DRAINAGE FROM THE AREA, FEVER, CHILLS, IF THE REDNESS CROSSES YOUR ANKLE JOINT OR FOR ANY OTHER CONCERNING CONDITIONS PLEASE REFER TO THE ER IMMEDIATELY.      Cellulitis    Cellulitis is a skin infection caused by bacteria. This condition occurs most often in the arms and lower legs but can occur anywhere over the body. Symptoms include redness, swelling, warm skin, tenderness, and chills/fever. If you were prescribed an antibiotic medicine, take it as told by your health care provider. Do not stop taking the antibiotic even if you start to feel better.    SEEK IMMEDIATE MEDICAL CARE IF YOU HAVE THE FOLLOWING SYMPTOMS: worsening fever, red streaks coming from affected area, vomiting or diarrhea, or dizziness/lightheadedness.

## 2025-02-13 NOTE — TELEPHONE ENCOUNTER
Contacted patient to check on their well-being, as well as inform them of their upcoming ID follow-up appointment and lab work ordered per provider's instructions following patient's recent discharge.    No answer, left patient requesting they return our call in regards to confirming their upcoming appointment at our office, as well as discuss active lab orders in place for patient to have complete prior to their appointment.    Provided office number and encouraged patient to give us a call at their earliest convenience so we may assist them further. Will attempt to call at a later time.

## 2025-02-13 NOTE — PROGRESS NOTES
OPAT NOTE    AP ONLY CAMPUSES ARE: [facility].   In these cases, physician is only cosigning notes.    Supervising/Discharge provider: Fabrizio Velarde    Diagnosis: Diverticulitis, Colonic Diverticular Abscess    Drug: Augmentin    Dose/Route/Frequency: 875-125 mg PO Q12H    Labs/Frequency: CBCD in 4 weeks prior to appointment    End Date: Until CT shows resolution of abscess    Infusion/VNA/SNF contact: N/A    Next appointment: 3/13, 1:30pm

## 2025-02-13 NOTE — CASE MANAGEMENT
Case Management Discharge Planning Note    Patient name Miley Locke  Location / MRN [id number]  : 1955 Date 2025       Current Admission Date: 2025  Current Admission Diagnosis:Diverticulitis   Patient Active Problem List    Diagnosis Date Noted Date Diagnosed    Abnormal CT of the abdomen 02/10/2025     Hypertension 02/10/2025     Hypokalemia 02/10/2025     Colonic diverticular abscess 02/10/2025     Constipation 2025     Diverticulitis 2025     Heart murmur 2022     History of partial knee replacement 2022     Dyslipidemia 2019     Prediabetes 2018     GERD (gastroesophageal reflux disease) 2012       LOS (days): 3  Geometric Mean LOS (GMLOS) (days): 3.8  Days to GMLOS:0.7     OBJECTIVE:  Risk of Unplanned Readmission Score: 8.69         Current admission status: Inpatient   [facility]   [id number] [place] [address] [facility] [address] [address]  Phone: [phone] Fax: [phone]    Primary Care Provider: No primary care provider on file.    Primary Insurance: MEDICARE  Secondary Insurance: BLUE CROSS    DISCHARGE DETAILS:  Pt is being dc'd home on this date with OP follow up. Spouse to transport pt home.

## 2025-02-13 NOTE — PROGRESS NOTES
Progress Note - Surgery-General   Name: Miley Locke 69 y.o. female I MRN: [id number]  Unit/Bed#:  I Date of Admission: 2/9/2025   Date of Service: 2/13/2025 I Hospital Day: 3     Assessment & Plan  Diverticulitis    Patient underwent transvaginal ultrasound yesterday by IR in [place], distended bladder with thickened colon wall likely consistent with infectious/inflammatory changes.  No drainable fluid collection.    She remains free of any abdominal pain.  Having loose bowel movements.  Tolerating diet.    Vital signs normal, afebrile.    White count today 4.96  Hemoglobin 13.2  CMP normal electrolytes and LFTs.    PLAN:    Patient is stable  for hospital discharge today from a surgical standpoint.  Discussed and updated via telephone with Dr. Clint Mendoza this morning.  No indication for any general surgical intervention during inpatient hospital stay.    Recommendations per ID regarding transition to p.o. Augmentin twice daily and duration of therapy.  Obtain outpatient CT scan abdomen and pelvis with contrast in 1 month  Follow-up with Dr. Clint Mendoza in the general surgery office after CT scan obtained.  Remainder medical management per the attending primary hospitalist service, updated Lorelei Benson PA-C and Dr. Ca.    Eventual outpatient colonoscopy by GI in about 6 weeks after current bout of diverticulitis has resolved and she completed antibiotic treatment.      69-year-old female with sigmoid diverticulitis and abscess, this is her first bout.  Denies any abdominal pain.  Having watery bowel movements.  Afebrile.  Remains NPO.  Patient for [facility] tertiary transfer to [facility] for IR drainage of pelvic abscess today.  Last colonoscopy about 5 years ago.    ID consult appreciated, patient remains on IV ceftriaxone and metronidazole.  ID recommended transition to p.o. Augmentin when further clinically improved.    CTAP +distal sigmoid diverticulitis complicated by 3 cm  abscess which appears to project inferiorly from the sigmoid colon and is inseparable from the vaginal cuff status post hysterectomy.  Possible colovaginal fistula.  Patient denies any vaginal discharge.    Vital signs reviewed, afebrile.    Patient comfortable, having loose BMs.  Tolerating diet.  Labs showed normal white count 5.59.  Hemoglobin 12.4.  CMP normal electrolytes.  Cr 1.01 Mag 2.1.  BC x 2 NG 24h    History of illness as follows:    69-year-old female with previous history of total abdominal hysterectomy in the past was seen at urgent care in [place] now 7 days ago because of abdominal cramping, decreased appetite and constipation.  She was discharged from urgent care and told to take Gas-X 4 times a day which did not improve her abdominal cramping and bloating.  She presented here to the ED on Saturday, 2 days ago.  Denied any fever or chills.  No previous history of similar symptoms.  Last colonoscopy was 5 years ago.  She was not told in the past that she had diverticulosis.  She was hospitalized here 10 or 12 years ago with small bowel obstruction resolved with conservative nonoperative management, NG tube decompression.    She denies any vaginal discharge.  No fever or chills.  GERD (gastroesophageal reflux disease)  Stable, remains on PPI.  Prediabetes  Noted, outpatient follow-up with PCP  Dyslipidemia  Management per the attending primary hospitalist service  Constipation  No bowel movement since admission.  Abnormal CT of the abdomen  As noted below.  Hypertension  Management per the attending primary service.  Hypokalemia  Initial K3.0, patient received electrolyte repletion.  Repeated K today normal 3.7  Colonic diverticular abscess  Clinical management and recommendations as noted above.  Patient for possible IR drainage later today.  Patient remains n.p.o. pending disposition from IR evaluation.    Ok for discharge from Surgery-General service perspective.    Subjective     Patient denies  any abdominal pain.  No fever.  Tolerating diet.  Having loose bowel movements.  No nausea or vomiting.  She was transported to [facility] yesterday for IR transvaginal ultrasound and possible drainage of pelvic abscess, no fluid collection could be found on ultrasound.  Patient remains on IV ceftriaxone and metronidazole.  ID consult has been following the patient and recommends transition to p.o. Augmentin twice daily for possibly up to 4 weeks.  No plan for any general surgical intervention at this time, recommend outpatient follow-up CT scan abdomen pelvis with contrast in a month to ensure abscess in sigmoid diverticulitis resolved.  Then follow-up with Dr. Clint Mendoza and eventual outpatient surveillance colonoscopy with GI in about 6 weeks.    Scheduled Meds:  Current Facility-Administered Medications   Medication Dose Route Frequency Provider Last Rate    acetaminophen  650 mg Oral Q6H PRN Ana Ferguson MD      atorvastatin  20 mg Oral Daily With Dinner Ana Ferguson MD      cefTRIAXone  2,000 mg Intravenous Q24H Ana Ferguson MD 2,000 mg (02/12/25 2055)    Cholecalciferol  2,000 Units Oral Daily Ana Ferguson MD      docusate sodium  100 mg Oral BID Tariq Rodriguez PA-C      enoxaparin  40 mg Subcutaneous Daily Tariq Rodriguez PA-C      fentaNYL   Intravenous PRN Ladarius Enriquez MD      hydroCHLOROthiazide  12.5 mg Oral Daily Ana Ferguson MD      losartan  50 mg Oral Daily Ana Ferguson MD      metroNIDAZOLE  500 mg Oral Q8H OFELIA Velarde MD      midazolam    PRN Ladarius Enriquez MD      morphine injection  4 mg Intravenous Q4H PRN Ana Ferguson MD      ondansetron  4 mg Intravenous Q6H PRN Ana Ferguson MD      pantoprazole  40 mg Oral Daily Before Breakfast Ana Ferguson MD       Continuous Infusions:   PRN Meds:.  acetaminophen    fentaNYL    midazolam    morphine injection    ondansetron      Objective :  Temp:  [97.4 °F (36.3 °C)-98.3  °F (36.8 °C)] 98 °F (36.7 °C)  HR:  [62-92] 62  BP: (111-156)/(58-81) 111/58  Resp:  [16-18] 18  SpO2:  [96 %-98 %] 98 %  O2 Device: None (Room air)  Nasal Cannula O2 Flow Rate (L/min):  [0 L/min-5 L/min] 0 L/min  FiO2 (%):  [22] 22    I/O         02/11 0701  02/12 0700 02/12 0701 02/13 0700 02/13 0701 02/14 0700    P.O. 360 0     Total Intake(mL/kg) 360 (4.1) 0 (0)     Urine (mL/kg/hr)       Total Output       Net +360 0                    Physical Exam  Vitals reviewed.   HENT:      Head: Normocephalic.      Nose: Nose normal.      Mouth/Throat:      Pharynx: Oropharynx is clear.   Cardiovascular:      Rate and Rhythm: Regular rhythm.      Heart sounds: Normal heart sounds. No murmur heard.  Pulmonary:      Breath sounds: Normal breath sounds. No wheezing.   Abdominal:      General: Abdomen is flat. Bowel sounds are normal. There is no distension.      Palpations: Abdomen is soft. There is no mass.      Tenderness: There is no abdominal tenderness.   Musculoskeletal:      Right lower leg: No edema.      Left lower leg: No edema.   Skin:     Coloration: Skin is not pale.      Findings: No erythema, lesion or rash.   Neurological:      General: No focal deficit present.      Mental Status: She is alert and oriented to person, place, and time.      Motor: No weakness.   Psychiatric:         Mood and Affect: Mood normal.         Thought Content: Thought content normal.         Lab Results: I have reviewed the following results:  Recent Labs     02/11/25  0643 02/12/25  0558 02/13/25  0601   WBC 8.17   < > 4.96   HGB 12.4   < > 13.2   HCT 36.4   < > 38.6      < > 274   SODIUM 139   < > 139   K 3.7   < > 3.7      < > 101   CO2 30   < > 29   BUN 9   < > 13   CREATININE 0.98   < > 1.04   GLUC 115   < > 101   MG 2.1  --   --    AST  --    < > 19   ALT  --    < > 11   ALB  --    < > 3.9   TBILI  --    < > 0.51   ALKPHOS  --    < > 54    < > = values in this interval not displayed.       Imaging Results  "Review: No pertinent imaging studies reviewed.  Other Study Results Review: No additional pertinent studies reviewed.    VTE Pharmacologic Prophylaxis: Enoxaparin (Lovenox)  VTE Mechanical Prophylaxis: sequential compression device    Tariq Rodriguez PA-C    **Please Note: Portions of the record may have been created using voice recognition software.  Occasional wrong word or \"sound a like\" substitutions may have occurred due to the inherent limitations of voice recognition software.  Read the chart carefully and recognize, using context, where substitutions have occurred.**      "

## 2025-02-13 NOTE — DISCHARGE SUMMARY
Discharge Summary - Hospitalist   Name: Miley Locke 69 y.o. female I MRN: [id number]  Unit/Bed#:  I Date of Admission: 2/9/2025   Date of Service: 2/13/2025 I Hospital Day: 3     Assessment & Plan  Diverticulitis  Pt presenting with constipation and gas pain found to have the findings below on CT  Distal sigmoid diverticulitis complicated by 3 cm abscess.   Note that the abscess projects inferiorly from the sigmoid colon and is inseparable from the vaginal cuff status post hysterectomy.  Correlate clinically for any vaginal discharge to suggest a fistula.   Fluid contents within the cecum and distal small bowel, consistent with a diarrheal GI illness versus mild enterocolitis.  Possibly reactive to the above findings.    Patient seen with diverticulitis and 3 cm abscess   Remains afebrile wo leukocytosis    General surgery determined no need for surgical intervention at this time, recommending IR drainage.   For boomerang to SLB IR transvaginal aspiration - no aspiration required  Continue with IV abx with Rocephin and Flagyl   ID consult appreciated  To follow up with CT abd in 4 weeks and will follow up with surgery and ID thereafter to ensure resolution  Resume diet after procedure   Analgesia for pain control.   Blood cultures negative at 72hrs   Discussed with patient need for colonoscopy 6 weeks after diverticulitis is resolved - she reports her PCP sets up her colonoscopies and prefers to go through him    GERD (gastroesophageal reflux disease)  Continue with protonix 40 mg daily  Prediabetes  Outpatient follow up with PCP  Dyslipidemia  Continue with home dose of atorvastatin  Constipation  Last BM 2/10, now having BM again  Abnormal CT of the abdomen  CT with 4 mm hypodense lesion in the pancreatic body. For simple cyst(s) less than 1.5 cm, recommend follow-up every 2 years for 5 times or to age 80, whichever comes first. Follow-up can stop at age 80 or can switch over to 80 year or older  "algorithm. Recommend next follow-up in 2 years.  Preferred imaging modality: abdomen MRI and MRCP with and without IV contrast, or triple phase abdomen CT with IV contrast, or abdomen MRI and MRCP without IV contrast.  Should be followed up with PCP  Hypertension  Continue losartan and HCTZ  BP stable  Hypokalemia  Replete as indicated  Colonic diverticular abscess  See plan as under \"diverticulitis\"      Medical Problems       Resolved Problems  Date Reviewed: 2/9/2025   None       Discharging Physician / Practitioner: Lorelei Benson PA-C  PCP: No primary care provider on file.  Admission Date:   Admission Orders (From admission, onward)       Ordered        02/10/25 0842  INPATIENT ADMISSION  Once            02/09/25 2028  Place in Observation  Once                          Discharge Date: 02/13/25    Consultations During Hospital Stay:  General surgery  IR  Infectious disease    Procedures Performed:   none    Significant Findings / Test Results:     CT abdomen pelvis with contrast   Final Result      Distal sigmoid diverticulitis complicated by 3 cm abscess.      Note that the abscess projects inferiorly from the sigmoid colon and is inseparable from the vaginal cuff status post hysterectomy.   Correlate clinically for any vaginal discharge to suggest a fistula.      Fluid contents within the cecum and distal small bowel, consistent with a diarrheal GI illness versus mild enterocolitis.   Possibly reactive to the above findings.      4 mm hypodense lesion in the pancreatic body.   For simple cyst(s) less than 1.5 cm, recommend follow-up every 2 years for 5 times or to age 80, whichever comes first.   Follow-up can stop at age 80 or can switch over to 80 year or older algorithm.   Recommend next follow-up in 2 years.   Preferred imaging modality: abdomen MRI and MRCP with and without IV contrast, or triple phase abdomen CT with IV contrast, or abdomen MRI and MRCP without IV contrast.      Findings were discussed " "with Dr. Smith from the ED at 7:10 p.m. on 2/9/2025         Workstation performed: [id number]         IR drainage tube placement    (Results Pending)   US transvaginal only    (Results Pending)   CT abdomen pelvis w contrast    (Results Pending)         Incidental Findings:   As above    Test Results Pending at Discharge (will require follow up):   none     Outpatient Tests Requested:  CT abd    Complications:  none    Reason for Admission: diverticulitis and abscess    Hospital Course:   Miley Locke is a 69 y.o. female patient who originally presented to the hospital on 2/9/2025 due to abdominal discomfort and constipation for the past 1 wk. Her abdominal discomfort is more generalized but then states localizes to the left lower quadrant. No vomiting. She was seen at the urgent care 2 days ago where she was prescribed gas X and peptobismol and told to come to the ED if symptoms worsens. She felt a little better on Thursday though she had fever either on Wednesday or Thursday. Pt states she did not feel improved and with increasing pain and decreased BM and hence presented today. She is not sure if her decreased BM is due to poor oral intake as well. She was very concern because of her history of SBO. In the ED she had a CT done showing evidence of sigmoid diverticulitis and abscess. Gen surgery recommended medicine admit and no acute intervention. Pt given IV abx in the ED.       Please see above list of diagnoses and related plan for additional information.     Condition at Discharge: good    Discharge Day Visit / Exam:   Subjective:  patient denies any abdominal discomfort. Denies fevers or chills, nausea or vomiting. Bowels are starting to move.   Vitals: Blood Pressure: 111/58 (02/13/25 0500)  Pulse: 62 (02/13/25 0500)  Temperature: 98 °F (36.7 °C) (02/13/25 0500)  Temp Source: Tympanic (02/13/25 0500)  Respirations: 18 (02/13/25 0500)  Height: 5' 5\" (165.1 cm) (02/09/25 2120)  Weight - Scale: 86.9 kg (191 " lb 9.6 oz) (02/09/25 2120)  SpO2: 98 % (02/13/25 0500)  Physical Exam  Vitals and nursing note reviewed.   Constitutional:       General: She is not in acute distress.     Appearance: She is not ill-appearing.   Cardiovascular:      Rate and Rhythm: Normal rate and regular rhythm.      Pulses: Normal pulses.      Heart sounds: Normal heart sounds.   Pulmonary:      Effort: Pulmonary effort is normal.      Breath sounds: Normal breath sounds.   Abdominal:      General: Bowel sounds are normal.      Palpations: Abdomen is soft.      Tenderness: There is no abdominal tenderness. There is no guarding or rebound.   Musculoskeletal:      Right lower leg: No edema.      Left lower leg: No edema.   Neurological:      Mental Status: She is alert. Mental status is at baseline.   Psychiatric:         Mood and Affect: Mood normal.         Behavior: Behavior normal.          Discussion with Family: Patient declined call to .     Discharge instructions/Information to patient and family:   See after visit summary for information provided to patient and family.      Provisions for Follow-Up Care:  See after visit summary for information related to follow-up care and any pertinent home health orders.      Mobility at time of Discharge:   Basic Mobility Inpatient Raw Score: 24  JH-HLM Goal: 8: Walk 250 feet or more  JH-HLM Achieved: 8: Walk 250 feet ot more  HLM Goal achieved. Continue to encourage appropriate mobility.     Disposition:   Home    Planned Readmission: none    Discharge Medications:  See after visit summary for reconciled discharge medications provided to patient and/or family.      Administrative Statements   Discharge Statement:  I have spent a total time of 48 minutes in caring for this patient on the day of the visit/encounter. >30 minutes of time was spent on: Diagnostic results, Prognosis, Risks and benefits of tx options, Instructions for management, Patient and family education, Importance of tx  compliance, Risk factor reductions, Impressions, Counseling / Coordination of care, Documenting in the medical record, Reviewing / ordering tests, medicine, procedures  , and Communicating with other healthcare professionals .    **Please Note: This note may have been constructed using a voice recognition system**

## 2025-02-13 NOTE — PROGRESS NOTES
Progress Note - Infectious Disease   Name: Miley Locke 69 y.o. female I MRN: [id number]  Unit/Bed#:  I Date of Admission: 2/9/2025   Date of Service: 2/13/2025 I Hospital Day: 3      Administrative Statements   VIRTUAL CARE DOCUMENTATION:     1. This service was provided via Telemedicine using [employer]     2. Parties in the room with patient during teleconsult Patient only    3. Confidentiality My office door was closed     4. Participants No one else was in the room    5. Patient acknowledged consent and understanding of privacy and security of the  Telemedicine consult. I informed the patient that I have reviewed their record in Epic and presented the opportunity for them to ask any questions regarding the visit today.  The patient agreed to participate.    6. I have spent a total time of 30 minutes in caring for this patient on the day of the visit/encounter including Diagnostic results, Impressions, Counseling / Coordination of care, Documenting in the medical record, Reviewing / ordering tests, medicine, procedures  , and Communicating with other healthcare professionals , not including the time spent for establishing the audio/video connection.      Assessment & Plan  Colonic diverticular abscess  Patient presented with abdominal pain, with CT showing sigmoid diverticulitis and a peridiverticular abscess.  Patient notes already clinically much improved on antibiotics and remains systemically well.  Surgery evaluated patient and felt that surgery is not a necessary.  Patient is clinically improved on current antibiotic regimen.  IR evaluation noted.  There was no good window for percutaneous drainage.  Transvaginal drainage was attempted but there was no visible abscess via transvaginal ultrasound and therefore, no drainage was noted.  Continue IV Unasyn.  Monitor temperature/WBC.  Monitor for recurrent abdominal pain.  Transition to p.o. Augmentin at discharge.  Repeat abdomen/pelvis CT in 3 to  4 weeks.  Patient should remain on antibiotic until repeat CT shows resolution of abscess.  Diverticulitis  Patient has no history of diverticulitis.  By her report, she had colonoscopy 5 years ago and was told it was normal.  Patient will need repeat colonoscopy in a few weeks to assess for evidence of diverticulosis.  Antibiotic plan as in above.  Recommend repeat colonoscopy in a few weeks.  Abnormal CT of the abdomen  Patient also has a hypodense lesion noted on pancreas on CT.  Follow-up per PCP and primary service.    Discussed with patient in detail regarding the above plan.  I have discussed with Lorelei Benson PA-C from primary service regarding the above plan to continue antibiotic pending repeat outpatient CT.  She agrees with the plan.  Ok for discharge from Infectious Disease service perspective.  Follow-up with us after repeat abdomen/pelvis CT.    Antibiotics:  Unasyn  Antibiotic # 5     Subjective   Patient feels well.  She has minimal abdominal pain now.  She is eating well.  Temperature stays down.  No chills.  She is tolerating antibiotic well.  No nausea, vomiting or diarrhea..    Objective :  Temp:  [97.4 °F (36.3 °C)-98.3 °F (36.8 °C)] 98 °F (36.7 °C)  HR:  [62-92] 62  BP: (111-156)/(58-81) 111/58  Resp:  [16-18] 18  SpO2:  [96 %-98 %] 98 %  O2 Device: None (Room air)  Nasal Cannula O2 Flow Rate (L/min):  [0 L/min-5 L/min] 0 L/min  FiO2 (%):  [22] 22    Physical exam has been primarily done by the patient's nurse and/or the primary service due to limited examination abilities on telemedicine.     General:  No acute distress  Psychiatric:  Awake and alert  Pulmonary:  Normal respiratory excursion without accessory muscle use  Abdomen:  Soft, nontender  Extremities:  No edema  Skin:  No rashes      Lab Results: I have reviewed the following results:  Results from last 7 days   Lab Units 02/13/25  0601 02/12/25  0558 02/11/25  0643   WBC Thousand/uL 4.96 5.59 8.17   HEMOGLOBIN g/dL 13.2 12.4 12.4    PLATELETS Thousands/uL 274 237 230     Results from last 7 days   Lab Units 02/13/25  0601 02/12/25  0558 02/11/25  0643 02/10/25  0647   SODIUM mmol/L 139 139 139 140   POTASSIUM mmol/L 3.7 3.6 3.7 3.7   CHLORIDE mmol/L 101 103 102 104   CO2 mmol/L 29 29 30 26   BUN mg/dL 13 9 9 11   CREATININE mg/dL 1.04 1.01 0.98 0.97   EGFR ml/min/1.73sq m 54 56 58 58   CALCIUM mg/dL 9.3 9.2 8.9 8.9   AST U/L 19 12*  --  10*   ALT U/L 11 9  --  7   ALK PHOS U/L 54 57  --  63   ALBUMIN g/dL 3.9 3.9  --  3.7     Results from last 7 days   Lab Units 02/09/25  1929   BLOOD CULTURE  No Growth at 48 hrs.  No Growth at 48 hrs.

## 2025-02-13 NOTE — ASSESSMENT & PLAN NOTE
CT with 4 mm hypodense lesion in the pancreatic body. For simple cyst(s) less than 1.5 cm, recommend follow-up every 2 years for 5 times or to age 80, whichever comes first. Follow-up can stop at age 80 or can switch over to 80 year or older algorithm. Recommend next follow-up in 2 years.  Preferred imaging modality: abdomen MRI and MRCP with and without IV contrast, or triple phase abdomen CT with IV contrast, or abdomen MRI and MRCP without IV contrast.  Should be followed up with PCP

## 2025-02-13 NOTE — ASSESSMENT & PLAN NOTE
Pt presenting with constipation and gas pain found to have the findings below on CT  Distal sigmoid diverticulitis complicated by 3 cm abscess.   Note that the abscess projects inferiorly from the sigmoid colon and is inseparable from the vaginal cuff status post hysterectomy.  Correlate clinically for any vaginal discharge to suggest a fistula.   Fluid contents within the cecum and distal small bowel, consistent with a diarrheal GI illness versus mild enterocolitis.  Possibly reactive to the above findings.    Patient seen with diverticulitis and 3 cm abscess   Remains afebrile wo leukocytosis    General surgery determined no need for surgical intervention at this time, recommending IR drainage.   For boomerang to SLB IR transvaginal aspiration - no aspiration required  Continue with IV abx with Rocephin and Flagyl   ID consult appreciated  To follow up with CT abd in 4 weeks and will follow up with surgery and ID thereafter to ensure resolution  Resume diet after procedure   Analgesia for pain control.   Blood cultures negative at 72hrs   Discussed with patient need for colonoscopy 6 weeks after diverticulitis is resolved - she reports her PCP sets up her colonoscopies and prefers to go through him

## 2025-02-13 NOTE — ASSESSMENT & PLAN NOTE
Patient presented with abdominal pain, with CT showing sigmoid diverticulitis and a peridiverticular abscess.  Patient notes already clinically much improved on antibiotics and remains systemically well.  Surgery evaluated patient and felt that surgery is not a necessary.  Patient is clinically improved on current antibiotic regimen.  IR evaluation noted.  There was no good window for percutaneous drainage.  Transvaginal drainage was attempted but there was no visible abscess via transvaginal ultrasound and therefore, no drainage was noted.  Continue IV Unasyn.  Monitor temperature/WBC.  Monitor for recurrent abdominal pain.  Transition to p.o. Augmentin at discharge.  Repeat abdomen/pelvis CT in 3 to 4 weeks.  Patient should remain on antibiotic until repeat CT shows resolution of abscess.

## 2025-02-13 NOTE — DISCHARGE INSTR - AVS FIRST PAGE
Low residue low fiber diet on discharge    Resume usual home medications.    You will be prescribed oral antibiotics to take twice a day for prescribed course of duration.    Stool softener, over-the-counter Colace twice a day as needed for constipation.    Outpatient CAT scan abdomen pelvis to be done in 1 month for comparison and to ensure resolution of acute diverticulitis and abscess.    Follow-up with Dr. Clint Mendoza after CT scan has been performed in about 1 month.    Follow-up with gastroenterology in about 6 weeks for arrangements to proceed with surveillance colonoscopy.    Tariq Rodriguez PA-C

## 2025-02-14 NOTE — TELEPHONE ENCOUNTER
Patient calls back today to discuss. She has started her antibiotic last evening. She knows her appointment with us is 3/13 and we called to schedule CT scan at [facility]. Patient aware of labs prior. She has no further questions for us and will call should any further questions arise.

## 2025-02-15 ENCOUNTER — RESULTS FOLLOW-UP (OUTPATIENT)
Dept: EMERGENCY DEPT | Facility: HOSPITAL | Age: 70
End: 2025-02-15

## 2025-02-15 LAB
BACTERIA BLD CULT: NORMAL
BACTERIA BLD CULT: NORMAL

## 2025-03-03 ENCOUNTER — APPOINTMENT (OUTPATIENT)
Dept: LAB | Facility: MEDICAL CENTER | Age: 70
End: 2025-03-03
Payer: MEDICARE

## 2025-03-03 DIAGNOSIS — K57.92 DIVERTICULITIS: ICD-10-CM

## 2025-03-03 DIAGNOSIS — Z79.2 LONG TERM (CURRENT) USE OF ANTIBIOTICS: ICD-10-CM

## 2025-03-03 DIAGNOSIS — K57.20 COLONIC DIVERTICULAR ABSCESS: ICD-10-CM

## 2025-03-03 LAB
BASOPHILS # BLD AUTO: 0.09 THOUSANDS/ÂΜL (ref 0–0.1)
BASOPHILS NFR BLD AUTO: 1 % (ref 0–1)
EOSINOPHIL # BLD AUTO: 0.1 THOUSAND/ÂΜL (ref 0–0.61)
EOSINOPHIL NFR BLD AUTO: 1 % (ref 0–6)
ERYTHROCYTE [DISTWIDTH] IN BLOOD BY AUTOMATED COUNT: 13.2 % (ref 11.6–15.1)
HCT VFR BLD AUTO: 39.6 % (ref 34.8–46.1)
HGB BLD-MCNC: 13.8 G/DL (ref 11.5–15.4)
IMM GRANULOCYTES # BLD AUTO: 0.04 THOUSAND/UL (ref 0–0.2)
IMM GRANULOCYTES NFR BLD AUTO: 1 % (ref 0–2)
LYMPHOCYTES # BLD AUTO: 2.22 THOUSANDS/ÂΜL (ref 0.6–4.47)
LYMPHOCYTES NFR BLD AUTO: 26 % (ref 14–44)
MCH RBC QN AUTO: 31.4 PG (ref 26.8–34.3)
MCHC RBC AUTO-ENTMCNC: 34.8 G/DL (ref 31.4–37.4)
MCV RBC AUTO: 90 FL (ref 82–98)
MONOCYTES # BLD AUTO: 0.82 THOUSAND/ÂΜL (ref 0.17–1.22)
MONOCYTES NFR BLD AUTO: 10 % (ref 4–12)
NEUTROPHILS # BLD AUTO: 5.19 THOUSANDS/ÂΜL (ref 1.85–7.62)
NEUTS SEG NFR BLD AUTO: 61 % (ref 43–75)
NRBC BLD AUTO-RTO: 0 /100 WBCS
PLATELET # BLD AUTO: 296 THOUSANDS/UL (ref 149–390)
PMV BLD AUTO: 9.9 FL (ref 8.9–12.7)
RBC # BLD AUTO: 4.4 MILLION/UL (ref 3.81–5.12)
WBC # BLD AUTO: 8.46 THOUSAND/UL (ref 4.31–10.16)

## 2025-03-03 PROCEDURE — 36415 COLL VENOUS BLD VENIPUNCTURE: CPT

## 2025-03-03 PROCEDURE — 85025 COMPLETE CBC W/AUTO DIFF WBC: CPT

## 2025-03-07 ENCOUNTER — HOSPITAL ENCOUNTER (OUTPATIENT)
Dept: CT IMAGING | Facility: HOSPITAL | Age: 70
Discharge: HOME | End: 2025-03-07
Payer: MEDICARE

## 2025-03-07 DIAGNOSIS — K57.20 COLONIC DIVERTICULAR ABSCESS: ICD-10-CM

## 2025-03-07 PROCEDURE — 74177 CT ABD & PELVIS W/CONTRAST: CPT

## 2025-03-07 RX ADMIN — IOHEXOL 100 ML: 350 INJECTION, SOLUTION INTRAVENOUS at 07:54

## 2025-03-12 ENCOUNTER — OFFICE VISIT (OUTPATIENT)
Dept: SURGERY | Facility: CLINIC | Age: 70
End: 2025-03-12
Payer: MEDICARE

## 2025-03-12 VITALS
HEART RATE: 80 BPM | BODY MASS INDEX: 31.75 KG/M2 | TEMPERATURE: 97.9 F | HEIGHT: 65 IN | OXYGEN SATURATION: 96 % | DIASTOLIC BLOOD PRESSURE: 70 MMHG | WEIGHT: 190.6 LBS | SYSTOLIC BLOOD PRESSURE: 132 MMHG

## 2025-03-12 DIAGNOSIS — K57.20 COLONIC DIVERTICULAR ABSCESS: ICD-10-CM

## 2025-03-12 DIAGNOSIS — K57.92 DIVERTICULITIS: Primary | ICD-10-CM

## 2025-03-12 PROCEDURE — 99214 OFFICE O/P EST MOD 30 MIN: CPT | Performed by: SURGERY

## 2025-03-12 NOTE — ASSESSMENT & PLAN NOTE
Recent admission for acute diverticulitis which was complicated with intramural abscess.  Attempted drainage procedure but was aborted due to no definitive collection on transvaginal ultrasound.  Doing very well.  Finishing her course of p.o. antibiotics as per ID.

## 2025-03-12 NOTE — ASSESSMENT & PLAN NOTE
Recent admission for acute complicated diverticulitis with intramural abscess abutting the vaginal cuff.  Patient underwent transvaginal ultrasound but no definitive collection to be drained.  She was placed on IV antibiotics and subsequent p.o. antibiotics as per infectious disease.  Currently doing well.  No abdominal pain.  Tolerating diet.  No changes in bowel bladder habits.  Repeat CT scan showing resolution of the intramural abscess.  Patient is to continue her follow-up with PCP and subsequent colonoscopy in 6 to 8 weeks.  In addition she will be following with infectious disease.  Encourage patient to finish her course of antibiotics at this time.  No further follow-up with me needed unless something should change.  We did briefly discuss signs and symptoms of an active colovaginal fistula given the fact that her sigmoid colon is abutting the vaginal cuff.

## 2025-03-12 NOTE — PROGRESS NOTES
Name: Miley Locke      : 1955      MRN: [id number]  Encounter Provider: Clint Mendoza DO  Encounter Date: 3/12/2025   Encounter department: [facility] [place]  :  Assessment & Plan  Diverticulitis  Recent admission for acute diverticulitis which was complicated with intramural abscess.  Attempted drainage procedure but was aborted due to no definitive collection on transvaginal ultrasound.  Doing very well.  Finishing her course of p.o. antibiotics as per ID.         Colonic diverticular abscess  Recent admission for acute complicated diverticulitis with intramural abscess abutting the vaginal cuff.  Patient underwent transvaginal ultrasound but no definitive collection to be drained.  She was placed on IV antibiotics and subsequent p.o. antibiotics as per infectious disease.  Currently doing well.  No abdominal pain.  Tolerating diet.  No changes in bowel bladder habits.  Repeat CT scan showing resolution of the intramural abscess.  Patient is to continue her follow-up with PCP and subsequent colonoscopy in 6 to 8 weeks.  In addition she will be following with infectious disease.  Encourage patient to finish her course of antibiotics at this time.  No further follow-up with me needed unless something should change.  We did briefly discuss signs and symptoms of an active colovaginal fistula given the fact that her sigmoid colon is abutting the vaginal cuff.           Imaging:    Recent CT scan abdomen pelvis dated 2025 in addition to previous CT scan dated 2020 was personally reviewed by me.    Notes:    Recent discharge summary dated 3/13, 2025 was personally reviewed by me.    History of Present Illness   HPI  Miley Locke is a 69 y.o. female who presents today for follow-up after recently being admitted for acute complicated diverticulitis with intramural abscess.  Her colon was abutting the vaginal cuff given her prior hysterectomy.  There was a small  collection but when the patient went for transvaginal ultrasound for potential transvaginal drainage there was no collection to be drained.  She finished a course or is finishing a course of p.o. antibiotics as per ID.  Currently feeling well.  No nausea vomit.  No fevers or chills.  Tolerating diet.  No changes in bowel or bladder habits.  She does have follow-up with her PCP upcoming and does understand that she will need repeat colonoscopy.  She also states that she has an upcoming infectious disease appointment.    History obtained from: patient    Review of Systems    Was completed, all negative except as noted HPI.    Medical History Reviewed by provider this encounter:     .  Past Medical History   Past Medical History:   Diagnosis Date    Acid reflux     Bowel obstruction (HCC)     Hypertension      Past Surgical History:   Procedure Laterality Date    HYSTERECTOMY      IR DRAINAGE TUBE PLACEMENT  2/12/2025    JOINT REPLACEMENT      KNEE ARTHROCENTESIS       No family history on file.   reports that she has never smoked. She has never used smokeless tobacco. She reports that she does not drink alcohol and does not use drugs.  Current Outpatient Medications   Medication Instructions    amoxicillin-clavulanate (AUGMENTIN) 875-125 mg per tablet 1 tablet, Oral, Every 12 hours scheduled    atorvastatin (LIPITOR) 20 mg, Every morning    hydroCHLOROthiazide 12.5 mg, Daily    losartan (COZAAR) 50 mg, Daily    pantoprazole (PROTONIX) 40 mg, Daily    Vitamin D3 2,000 Units, Daily     Allergies   Allergen Reactions    Aspirin Edema and Swelling     edema      Current Outpatient Medications on File Prior to Visit   Medication Sig Dispense Refill    amoxicillin-clavulanate (AUGMENTIN) 875-125 mg per tablet Take 1 tablet by mouth every 12 (twelve) hours for 28 days 56 tablet 0    atorvastatin (LIPITOR) 20 mg tablet Take 20 mg by mouth every morning      Cholecalciferol (Vitamin D3) 50 MCG (2000 UT) capsule Take 2,000  "Units by mouth daily      hydroCHLOROthiazide 12.5 mg capsule Take 12.5 mg by mouth daily      losartan (COZAAR) 50 mg tablet Take 50 mg by mouth daily      pantoprazole (PROTONIX) 40 mg tablet Take 40 mg by mouth daily       No current facility-administered medications on file prior to visit.      Social History     Tobacco Use    Smoking status: Never    Smokeless tobacco: Never   Vaping Use    Vaping status: Never Used   Substance and Sexual Activity    Alcohol use: Never    Drug use: Never    Sexual activity: Not on file        Objective   /70 (BP Location: Left arm, Patient Position: Sitting, Cuff Size: Standard)   Pulse 80   Temp 97.9 °F (36.6 °C) (Temporal)   Ht 5' 5\" (1.651 m)   Wt 86.5 kg (190 lb 9.6 oz)   SpO2 96%   BMI 31.72 kg/m²      Physical Exam  Vitals reviewed.   Constitutional:       General: She is not in acute distress.     Appearance: Normal appearance. She is not ill-appearing, toxic-appearing or diaphoretic.   HENT:      Head: Normocephalic and atraumatic.      Right Ear: External ear normal.      Left Ear: External ear normal.   Eyes:      General: No scleral icterus.        Right eye: No discharge.         Left eye: No discharge.   Cardiovascular:      Rate and Rhythm: Normal rate.   Pulmonary:      Effort: Pulmonary effort is normal. No respiratory distress.   Abdominal:      General: Abdomen is flat. There is no distension.      Palpations: Abdomen is soft. There is no mass.      Tenderness: There is no abdominal tenderness.      Hernia: No hernia is present.   Musculoskeletal:         General: No swelling. Normal range of motion.      Cervical back: Normal range of motion. No rigidity.      Right lower leg: No edema.      Left lower leg: No edema.   Skin:     General: Skin is warm.      Coloration: Skin is not jaundiced or pale.      Findings: No bruising or erythema.   Neurological:      General: No focal deficit present.      Mental Status: She is alert and oriented to " person, place, and time.      Cranial Nerves: No cranial nerve deficit.   Psychiatric:         Mood and Affect: Mood normal.         Behavior: Behavior normal.         Thought Content: Thought content normal.         Judgment: Judgment normal.

## 2025-03-13 ENCOUNTER — OFFICE VISIT (OUTPATIENT)
Age: 70
End: 2025-03-13
Payer: MEDICARE

## 2025-03-13 VITALS
SYSTOLIC BLOOD PRESSURE: 130 MMHG | HEART RATE: 67 BPM | HEIGHT: 65 IN | WEIGHT: 190 LBS | OXYGEN SATURATION: 97 % | DIASTOLIC BLOOD PRESSURE: 80 MMHG | BODY MASS INDEX: 31.65 KG/M2 | TEMPERATURE: 97.9 F

## 2025-03-13 DIAGNOSIS — K57.20 COLONIC DIVERTICULAR ABSCESS: Primary | ICD-10-CM

## 2025-03-13 PROCEDURE — G2211 COMPLEX E/M VISIT ADD ON: HCPCS | Performed by: PHYSICIAN ASSISTANT

## 2025-03-13 PROCEDURE — 99214 OFFICE O/P EST MOD 30 MIN: CPT | Performed by: PHYSICIAN ASSISTANT

## 2025-03-13 NOTE — ASSESSMENT & PLAN NOTE
Recent admission to [facility] for abdominal pain, with CT showing sigmoid diverticulitis and a peridiverticular abscess.  Surgery evaluated patient and felt that surgery was not necessary. Per IR, there was no good window for percutaneous drainage. Transvaginal drainage approach was also attempted but there was no visible abscess via transvaginal ultrasound and therefore, no drainage was noted. Planned for empiric abx course until abscess resolved on repeat imaging.  Seen by outpatient surgery 3/12.   Repeat CT 3/7 reviewed shows resolution of abscess with possible colovaginal fistula. Patient without sx.   Labs reviewed 3/3 normal WBC.  Stop PO augmentin   No additional ID follow up required, call PRN  Outpatient follow up with GI for colonoscopy

## 2025-03-13 NOTE — PROGRESS NOTES
Name: Miley Locke      : 1955      MRN: [id number]  Encounter Provider: Eduardo Price PA-C  Encounter Date: 3/13/2025   Encounter department: [facility] [place]  :  Assessment & Plan  Colonic diverticular abscess  Recent admission to [facility] for abdominal pain, with CT showing sigmoid diverticulitis and a peridiverticular abscess.  Surgery evaluated patient and felt that surgery was not necessary. Per IR, there was no good window for percutaneous drainage. Transvaginal drainage approach was also attempted but there was no visible abscess via transvaginal ultrasound and therefore, no drainage was noted. Planned for empiric abx course until abscess resolved on repeat imaging.  Seen by outpatient surgery 3/12.   Repeat CT 3/7 reviewed shows resolution of abscess with possible colovaginal fistula. Patient without sx.   Labs reviewed 3/3 normal WBC.  Stop PO augmentin   No additional ID follow up required, call PRN  Outpatient follow up with GI for colonoscopy     Above assessment and plan discussed in detail with patient and  during examination.     Antibiotics:  PO augmentin    Subjective:  Patient presents to outpatient ID office for ongoing management of diverticular abscess. Patient is tolerating the PO Augmentin. Thinks it might be staining her teeth yellow. Otherwise no abdominal pain, nausea, vomiting, or diarrhea. No fevers or chills. Followed up with surg yesterday and reviewed imaging. Denies feculent vaginal output/discharge. Has plan to make appt with GI to discuss colonoscopy.     Objective:    Vitals:   Vitals:    25 1324   BP: 130/80   Pulse: 67   Temp: 97.9 °F (36.6 °C)   SpO2: 97%     Physical Exam:     General Appearance:  Alert, interactive, nontoxic, no acute distress.   HEENT: Oropharynx moist without lesions.    Lungs:   Clear to auscultation bilaterally; no wheezes, rhonchi or rales; respirations unlabored   Heart:  RRR; no murmur    Abdomen:   Soft, non-tender, non-distended, positive bowel sounds. No palpable organomegaly.   Skin: No new rashes or lesions. IV site nontender. No draining wounds noted.       Labs, Imaging, & Other studies:   All pertinent labs and imaging studies were personally reviewed by me from 3/3 and 3/7.    The following portions of the patient's history were reviewed and updated as appropriate: allergies, current medications, past family history, past medical history, past social history, past surgical history and problem list.

## 2025-03-13 NOTE — PATIENT INSTRUCTIONS
Stop the Augmentin  No additional antibiotics needed   No need to follow up with ID, call with questions

## 2025-03-26 ENCOUNTER — NURSE TRIAGE (OUTPATIENT)
Age: 70
End: 2025-03-26

## 2025-03-26 NOTE — TELEPHONE ENCOUNTER
Regarding: sooner appt  ----- Message from Arlette LI sent at 3/26/2025  3:48 PM EDT -----  Patients GI provider:  Dr. NEW PATIENT     Number to return call: [phone]    Reason for call: Pt called to schedule a colon screening, per Jessica from the office patient must have a consultation first. Please call patient for a sooner appt due to diverticular abscess.     Scheduled procedure/appointment date if applicable: Apt/procedure

## 2025-03-27 NOTE — TELEPHONE ENCOUNTER
Called patient, no answer, left message requesting she call back to speak with a nurse to triage. Patient referred for colonoscopy by surgeon and ID providers (recent hospitalization and follow up in both their offices) related to sigmoid diverticulitis and peridiverticular abscess. Patient currently scheduled for NP office visit 5/22/25 and requesting earlier.

## 2025-03-27 NOTE — TELEPHONE ENCOUNTER
Patient returning call.  Is new patient and was called for sooner appt d/t history of rectal abscess.  Referred to us for colonoscopy.    Having no issues at this time, was told to follow up for colonoscopy.  No sooner appt available.  Wants to make sure this is soon enough and should not be seen sooner.  Please advise.

## 2025-03-27 NOTE — TELEPHONE ENCOUNTER
Appointment  change to April 3rd at 3:30pm with Lucas Ovalle message left for patient about appointment change and office number left with any questions.

## 2025-04-03 ENCOUNTER — OFFICE VISIT (OUTPATIENT)
Dept: GASTROENTEROLOGY | Facility: CLINIC | Age: 70
End: 2025-04-03
Payer: MEDICARE

## 2025-04-03 VITALS
TEMPERATURE: 97.9 F | DIASTOLIC BLOOD PRESSURE: 73 MMHG | SYSTOLIC BLOOD PRESSURE: 126 MMHG | BODY MASS INDEX: 31.82 KG/M2 | OXYGEN SATURATION: 98 % | WEIGHT: 191 LBS | HEIGHT: 65 IN | HEART RATE: 75 BPM

## 2025-04-03 DIAGNOSIS — K21.00 GASTROESOPHAGEAL REFLUX DISEASE WITH ESOPHAGITIS WITHOUT HEMORRHAGE: ICD-10-CM

## 2025-04-03 DIAGNOSIS — K57.20 COLONIC DIVERTICULAR ABSCESS: Primary | ICD-10-CM

## 2025-04-03 DIAGNOSIS — K57.92 DIVERTICULITIS: ICD-10-CM

## 2025-04-03 PROBLEM — K59.00 CONSTIPATION: Status: RESOLVED | Noted: 2025-02-09 | Resolved: 2025-04-03

## 2025-04-03 PROCEDURE — 99204 OFFICE O/P NEW MOD 45 MIN: CPT | Performed by: NURSE PRACTITIONER

## 2025-04-03 RX ORDER — POLYETHYLENE GLYCOL 3350, SODIUM SULFATE ANHYDROUS, SODIUM BICARBONATE, SODIUM CHLORIDE, POTASSIUM CHLORIDE 236; 22.74; 6.74; 5.86; 2.97 G/4L; G/4L; G/4L; G/4L; G/4L
4000 POWDER, FOR SOLUTION ORAL ONCE
Qty: 4000 ML | Refills: 0 | Status: SHIPPED | OUTPATIENT
Start: 2025-04-03 | End: 2025-04-03

## 2025-04-03 RX ORDER — LOSARTAN POTASSIUM AND HYDROCHLOROTHIAZIDE 12.5; 5 MG/1; MG/1
1 TABLET ORAL EVERY MORNING
COMMUNITY

## 2025-04-03 RX ORDER — LEVOTHYROXINE SODIUM 25 UG/1
TABLET ORAL
COMMUNITY
Start: 2025-04-01

## 2025-04-03 RX ORDER — SODIUM CHLORIDE, SODIUM LACTATE, POTASSIUM CHLORIDE, CALCIUM CHLORIDE 600; 310; 30; 20 MG/100ML; MG/100ML; MG/100ML; MG/100ML
125 INJECTION, SOLUTION INTRAVENOUS CONTINUOUS
OUTPATIENT
Start: 2025-04-03

## 2025-04-03 NOTE — ASSESSMENT & PLAN NOTE
While the patient was in the office today, I discussed with the patient that with the possible abscess and the diverticulitis, I do feel it would be appropriate to proceed with a colonoscopy to evaluate for any other underlying etiology that could explain her symptoms.  The patient was agreeable and verbalized an understanding.    I obtained informed verbal consent from the patient. The risks/benefits/alternatives of the procedure were discussed with the patient. Risks included, but not limited to, infection, bleeding, perforation, injury to organs in the abdomen, missed lesion, and incomplete procedure were discussed. The patient gave verbal understanding and is agreeable to proceed. Bowel prep instructions were reviewed and given as ordered. Patient's questions were answered to the best of my ability and until they verbalized an understanding.      Orders:  •  Ambulatory Referral to Gastroenterology  •  Colonoscopy; Future  •  polyethylene glycol (Golytely) 4000 mL solution; Take 4,000 mL by mouth once for 1 dose Take 4000 mL by mouth once for 1 dose. Use as directed

## 2025-04-03 NOTE — PATIENT INSTRUCTIONS
Proceed with Colonoscopy.   Continue drink at least 2 bottles of water daily.   Continue to watch for red flag symptoms.   Schedule a f/u OV after Colonoscopy.     We did review GI red flag symptoms, including, but not limited to: chronic nausea, vomiting, diarrhea, chills, fever, and unintentional weight loss and should call or contact our office with any changes or concerns. I reviewed with the patient that if they notice any blood while vomiting or in their stool they should contact or office or go to the nearest emergency room for immediate evaluation. The patient was agreeable and verbalized an understanding.

## 2025-04-03 NOTE — PROGRESS NOTES
Name: Miley Locke      : 1955      MRN: [id number]  Encounter Provider: JENA You  Encounter Date: 4/3/2025   Encounter department: [facility] [place]  :  Assessment & Plan  Colonic diverticular abscess  While the patient was in the office today, I discussed with the patient that with the possible abscess and the diverticulitis, I do feel it would be appropriate to proceed with a colonoscopy to evaluate for any other underlying etiology that could explain her symptoms.  The patient was agreeable and verbalized an understanding.    I obtained informed verbal consent from the patient. The risks/benefits/alternatives of the procedure were discussed with the patient. Risks included, but not limited to, infection, bleeding, perforation, injury to organs in the abdomen, missed lesion, and incomplete procedure were discussed. The patient gave verbal understanding and is agreeable to proceed. Bowel prep instructions were reviewed and given as ordered. Patient's questions were answered to the best of my ability and until they verbalized an understanding.      Orders:  •  Ambulatory Referral to Gastroenterology  •  Colonoscopy; Future  •  polyethylene glycol (Golytely) 4000 mL solution; Take 4,000 mL by mouth once for 1 dose Take 4000 mL by mouth once for 1 dose. Use as directed    Diverticulitis  Orders:  •  Colonoscopy; Future  •  polyethylene glycol (Golytely) 4000 mL solution; Take 4,000 mL by mouth once for 1 dose Take 4000 mL by mouth once for 1 dose. Use as directed    Gastroesophageal reflux disease with esophagitis without hemorrhage  Stable    Continue Protonix as prescribed by primary care provider.  Continue to avoid acidic or trigger foods much as possible.       The patient is to schedule a follow up office visit after her colonoscopy, but understands to call or contact our offices with any issues before then or as needed.     History of Present Illness    Miley Locke is a 69 y.o. female who presents today for her initial consultation and evaluation regarding the need for colonoscopy after a possible diverticular abscess and diverticulitis.  The patient reports that when she left the hospital she was on Augmentin as recommended for 2 weeks and did have a recent follow-up with infectious disease.    The patient currently denies any reflux, nausea, dysphagia, vomiting, decreased appetite, unplanned weight loss, or abdominal pain.) Water Intake: 2 bottles daily.     The patient currently reports that they have a BM daily and reports that it is usually relieving, without any consistent diarrhea, constipation, straining, melena or bloody stools. Sandoval: 2. Last BM: This morning. Flatus: No.    The patient currently denies any evidence of jaundice, fevers, leni colored stools, swelling of the lower extremities, fatigue, confusion, memory loss or easy bruising.    PMH/PSH:   Abdominal/Chest Surgery: Hysterectomy, AUGUSTINA.  Colon Cancer: Denied  Any Cancer: Denied  Pre-Cancerous Polyps: Denied  Crohn's: Denied  Ulcerative Colitis: Denied  Sheth's Esophagus: Denied  Celiac Disease: Denied  Liver Disease: Denied    Tobacco/Vaping: Denied  ETOH: Occasionally  Marijuana: Denied  Illicit Drug Use: Denied    FH:  Colon Cancer: Denied  Any Cancer: Denied  Family Members with Pre-Cancerous Polyps: Denied  Crohn's: Denied  Ulcerative Colitis: Denied  Celiac Disease: Denied  Liver Disease: Deneid    GI Meds: Tonics 40 mg daily as prescribed by PCP.  Daily NSAID Use: Denied  Daily Tylenol Use: Denied  Any New Meds: Denied      Imaging: (3/7/25) CT of the abdomen and pelvis with contrast: Interval resolution of previously seen sigmoid intramural abscess without residual inflammatory changes. The sigmoid colon however remains inseparable from the vaginal cuff. Findings can be secondary to postinflammatory changes or due to presence of a   small colovaginal fistula. Clinical  "correlation is recommended.     Stable 4 mm pancreatic body hypoattenuating lesion. Please refer to report of CT abdomen pelvis 2/9/2025 regarding management recommendations.    Endoscopy History: EGD: (10-15 years): Normal, no documentation to review.     COLONOSCOPY: (2020): Normal, no documentation to review.     DUE: 2030     HPI  History obtained from: patient  Review of Systems A complete review of systems is negative other than that noted above in the HPI.      Current Outpatient Medications   Medication Sig Dispense Refill   • atorvastatin (LIPITOR) 20 mg tablet Take 20 mg by mouth every morning     • Cholecalciferol (Vitamin D3) 50 MCG (2000 UT) capsule Take 2,000 Units by mouth daily     • levothyroxine 25 mcg tablet TAKE 2 TABLETS ON TUESDAY AND THURSDAY TAKE 1 TABLET ALL OTHER DA...  (REFER TO PRESCRIPTION NOTES).     • losartan-hydrochlorothiazide (HYZAAR) 50-12.5 mg per tablet Take 1 tablet by mouth every morning     • pantoprazole (PROTONIX) 40 mg tablet Take 40 mg by mouth daily     • polyethylene glycol (Golytely) 4000 mL solution Take 4,000 mL by mouth once for 1 dose Take 4000 mL by mouth once for 1 dose. Use as directed 4000 mL 0   • hydroCHLOROthiazide 12.5 mg capsule Take 12.5 mg by mouth daily (Patient not taking: Reported on 4/3/2025)     • losartan (COZAAR) 50 mg tablet Take 50 mg by mouth daily (Patient not taking: Reported on 4/3/2025)       No current facility-administered medications for this visit.     Objective   /73 (BP Location: Left arm, Patient Position: Sitting, Cuff Size: Standard)   Pulse 75   Temp 97.9 °F (36.6 °C) (Temporal)   Ht 5' 5\" (1.651 m)   Wt 86.6 kg (191 lb)   SpO2 98%   BMI 31.78 kg/m²     Physical Exam   Sclera without icterus and benign. Lung sounds clear to auscultation b/l. Normal S1 & S2 upon exam. Abdomen is soft, nondistended, nontender, with hypoactive bowel sounds x 4.  No edema noted of the b/l lower extremities upon exam today. Skin is " non-icteric.     Lab Results: I personally reviewed relevant lab results.

## 2025-04-03 NOTE — ASSESSMENT & PLAN NOTE
Stable    Continue Protonix as prescribed by primary care provider.  Continue to avoid acidic or trigger foods much as possible.

## 2025-04-03 NOTE — ASSESSMENT & PLAN NOTE
Orders:  •  Colonoscopy; Future  •  polyethylene glycol (Golytely) 4000 mL solution; Take 4,000 mL by mouth once for 1 dose Take 4000 mL by mouth once for 1 dose. Use as directed

## 2025-05-06 ENCOUNTER — HOSPITAL ENCOUNTER (OUTPATIENT)
Dept: PERIOP | Facility: HOSPITAL | Age: 70
Setting detail: OUTPATIENT SURGERY
Discharge: HOME | End: 2025-05-06
Attending: NURSE PRACTITIONER
Payer: MEDICARE

## 2025-05-06 ENCOUNTER — ANESTHESIA (OUTPATIENT)
Dept: PERIOP | Facility: HOSPITAL | Age: 70
End: 2025-05-06
Payer: MEDICARE

## 2025-05-06 ENCOUNTER — ANESTHESIA EVENT (OUTPATIENT)
Dept: PERIOP | Facility: HOSPITAL | Age: 70
End: 2025-05-06
Payer: MEDICARE

## 2025-05-06 VITALS
HEIGHT: 67 IN | SYSTOLIC BLOOD PRESSURE: 132 MMHG | RESPIRATION RATE: 18 BRPM | TEMPERATURE: 98.8 F | DIASTOLIC BLOOD PRESSURE: 60 MMHG | OXYGEN SATURATION: 96 % | BODY MASS INDEX: 29.98 KG/M2 | HEART RATE: 60 BPM | WEIGHT: 191 LBS

## 2025-05-06 DIAGNOSIS — K57.92 DIVERTICULITIS: ICD-10-CM

## 2025-05-06 DIAGNOSIS — K57.20 COLONIC DIVERTICULAR ABSCESS: ICD-10-CM

## 2025-05-06 PROCEDURE — 88305 TISSUE EXAM BY PATHOLOGIST: CPT | Performed by: PATHOLOGY

## 2025-05-06 PROCEDURE — 45385 COLONOSCOPY W/LESION REMOVAL: CPT | Performed by: INTERNAL MEDICINE

## 2025-05-06 PROCEDURE — 45380 COLONOSCOPY AND BIOPSY: CPT | Performed by: INTERNAL MEDICINE

## 2025-05-06 RX ORDER — SODIUM CHLORIDE, SODIUM LACTATE, POTASSIUM CHLORIDE, CALCIUM CHLORIDE 600; 310; 30; 20 MG/100ML; MG/100ML; MG/100ML; MG/100ML
20 INJECTION, SOLUTION INTRAVENOUS CONTINUOUS
Status: DISCONTINUED | OUTPATIENT
Start: 2025-05-06 | End: 2025-05-10 | Stop reason: HOSPADM

## 2025-05-06 RX ORDER — PROPOFOL 10 MG/ML
INJECTION, EMULSION INTRAVENOUS CONTINUOUS PRN
Status: DISCONTINUED | OUTPATIENT
Start: 2025-05-06 | End: 2025-05-06

## 2025-05-06 RX ORDER — LIDOCAINE HYDROCHLORIDE 20 MG/ML
INJECTION, SOLUTION EPIDURAL; INFILTRATION; INTRACAUDAL; PERINEURAL AS NEEDED
Status: DISCONTINUED | OUTPATIENT
Start: 2025-05-06 | End: 2025-05-06

## 2025-05-06 RX ORDER — SODIUM CHLORIDE, SODIUM LACTATE, POTASSIUM CHLORIDE, CALCIUM CHLORIDE 600; 310; 30; 20 MG/100ML; MG/100ML; MG/100ML; MG/100ML
125 INJECTION, SOLUTION INTRAVENOUS CONTINUOUS
Status: DISCONTINUED | OUTPATIENT
Start: 2025-05-06 | End: 2025-05-10 | Stop reason: HOSPADM

## 2025-05-06 RX ORDER — PROPOFOL 10 MG/ML
INJECTION, EMULSION INTRAVENOUS AS NEEDED
Status: DISCONTINUED | OUTPATIENT
Start: 2025-05-06 | End: 2025-05-06

## 2025-05-06 RX ORDER — SODIUM CHLORIDE, SODIUM LACTATE, POTASSIUM CHLORIDE, CALCIUM CHLORIDE 600; 310; 30; 20 MG/100ML; MG/100ML; MG/100ML; MG/100ML
20 INJECTION, SOLUTION INTRAVENOUS CONTINUOUS
Status: CANCELLED | OUTPATIENT
Start: 2025-05-06

## 2025-05-06 RX ADMIN — LIDOCAINE HYDROCHLORIDE 50 MG: 20 INJECTION, SOLUTION EPIDURAL; INFILTRATION; INTRACAUDAL; PERINEURAL at 12:04

## 2025-05-06 RX ADMIN — SODIUM CHLORIDE, SODIUM LACTATE, POTASSIUM CHLORIDE, AND CALCIUM CHLORIDE: .6; .31; .03; .02 INJECTION, SOLUTION INTRAVENOUS at 11:45

## 2025-05-06 RX ADMIN — PROPOFOL 120 MCG/KG/MIN: 10 INJECTION, EMULSION INTRAVENOUS at 12:05

## 2025-05-06 RX ADMIN — PROPOFOL 80 MG: 10 INJECTION, EMULSION INTRAVENOUS at 12:04

## 2025-05-06 NOTE — ANESTHESIA PREPROCEDURE EVALUATION
Procedure:  COLONOSCOPY    Relevant Problems   ANESTHESIA (within normal limits)      CARDIO   (+) Heart murmur   (+) Hypertension      ENDO (within normal limits)      GI/HEPATIC  S/p bowel prep   (+) GERD (gastroesophageal reflux disease)      /RENAL (within normal limits)      HEMATOLOGY (within normal limits)      MUSCULOSKELETAL (within normal limits)      NEURO/PSYCH (within normal limits)      PULMONARY   (-) URI (upper respiratory infection)      Other   (+) Prediabetes        Physical Exam    Airway    Mallampati score: II  TM Distance: >3 FB  Neck ROM: full     Dental       Cardiovascular  Rhythm: regular, Rate: normal    Pulmonary   Breath sounds clear to auscultation    Other Findings  post-pubertal.      Anesthesia Plan  ASA Score- 2     Anesthesia Type- IV sedation with anesthesia with ASA Monitors.         Additional Monitors:     Airway Plan:     Comment: I discussed the risks and benefits of IV sedation anesthesia including the possibility of the need to convert to general anesthesia and the potential risk of awareness.  The patient was given the opportunity to ask questions, which were answered..       Plan Factors-Exercise tolerance (METS): >4 METS.    Chart reviewed.        Patient is not a current smoker.              Induction- intravenous.    Postoperative Plan-         Informed Consent- Anesthetic plan and risks discussed with patient and spouse.  I personally reviewed this patient with the CRNA. Discussed and agreed on the Anesthesia Plan with the CRNA..      NPO Status:  Vitals Value Taken Time   Date of last liquid 05/06/25 05/06/25 1026   Time of last liquid 0600 05/06/25 1026   Date of last solid 05/03/25 05/06/25 1026   Time of last solid 1900 05/06/25 1026

## 2025-05-06 NOTE — ANESTHESIA POSTPROCEDURE EVALUATION
Post-Op Assessment Note    CV Status:  Stable  Pain Score: 0    Pain management: adequate       Mental Status:  Sleepy   Hydration Status:  Euvolemic   PONV Controlled:  Controlled   Airway Patency:  Patent     Post Op Vitals Reviewed: Yes    No anethesia notable event occurred.    Staff: CRNA           Last Filed PACU Vitals:  Vitals Value Taken Time   Temp 97.7    Pulse 67 05/06/25 1233   BP 95/53    Resp 19 05/06/25 1233   SpO2 100 % 05/06/25 1233   Vitals shown include unfiled device data.

## 2025-05-06 NOTE — H&P
"History and Physical -  [facility]  Miley Locke 69 y.o. female MRN: [id number]                  HPI: Miley Locke is a 69 y.o. year old female who presents for history of diverticulitis with abscess.      REVIEW OF SYSTEMS: Per the HPI, and otherwise unremarkable.    Historical Information   Past Medical History:   Diagnosis Date    Acid reflux     Bowel obstruction (HCC)     Hypertension      Past Surgical History:   Procedure Laterality Date    HYSTERECTOMY      IR DRAINAGE TUBE PLACEMENT  02/12/2025    JOINT REPLACEMENT Right     partial knee replacement    KNEE ARTHROCENTESIS       Social History   Social History     Substance and Sexual Activity   Alcohol Use Never     Social History     Substance and Sexual Activity   Drug Use Never     Social History     Tobacco Use   Smoking Status Never   Smokeless Tobacco Never     History reviewed. No pertinent family history.    Meds/Allergies       Current Outpatient Medications:     atorvastatin (LIPITOR) 20 mg tablet    Cholecalciferol (Vitamin D3) 50 MCG (2000 UT) capsule    levothyroxine 25 mcg tablet    losartan-hydrochlorothiazide (HYZAAR) 50-12.5 mg per tablet    pantoprazole (PROTONIX) 40 mg tablet    polyethylene glycol (Golytely) 4000 mL solution    Current Facility-Administered Medications:     lactated ringers infusion, 125 mL/hr, Intravenous, Continuous    lactated ringers infusion, 20 mL/hr, Intravenous, Continuous    Allergies   Allergen Reactions    Aspirin Edema and Swelling     edema       Objective     /70   Pulse 80   Temp 97.6 °F (36.4 °C) (Temporal)   Resp 18   Ht 5' 7\" (1.702 m)   Wt 86.6 kg (191 lb)   SpO2 94%   BMI 29.91 kg/m²       PHYSICAL EXAM    Gen: NAD  Head: NCAT  CV: RRR  CHEST: Clear  ABD: soft, NT/ND  EXT: no edema      ASSESSMENT/PLAN:  This is a 69 y.o. year old female here for colonoscopy, and she is stable and optimized for her procedure.        "

## 2025-05-09 PROCEDURE — 88305 TISSUE EXAM BY PATHOLOGIST: CPT | Performed by: PATHOLOGY

## 2025-05-13 ENCOUNTER — RESULTS FOLLOW-UP (OUTPATIENT)
Dept: GASTROENTEROLOGY | Facility: CLINIC | Age: 70
End: 2025-05-13

## 2025-05-13 NOTE — RESULT ENCOUNTER NOTE
Polyp was an adenoma (precancerous but no cancer) so repeat colonoscopy in 7 years. This was communicated via [facility].